# Patient Record
Sex: MALE | Race: BLACK OR AFRICAN AMERICAN | Employment: OTHER | URBAN - METROPOLITAN AREA
[De-identification: names, ages, dates, MRNs, and addresses within clinical notes are randomized per-mention and may not be internally consistent; named-entity substitution may affect disease eponyms.]

---

## 2020-07-11 ENCOUNTER — APPOINTMENT (OUTPATIENT)
Dept: ULTRASOUND IMAGING | Age: 70
End: 2020-07-11
Payer: MEDICARE

## 2020-07-11 ENCOUNTER — HOSPITAL ENCOUNTER (EMERGENCY)
Age: 70
Discharge: HOME | End: 2020-07-11
Payer: MEDICARE

## 2020-07-11 ENCOUNTER — APPOINTMENT (OUTPATIENT)
Dept: CT IMAGING | Age: 70
End: 2020-07-11
Payer: MEDICARE

## 2020-07-11 VITALS
DIASTOLIC BLOOD PRESSURE: 83 MMHG | HEIGHT: 74 IN | WEIGHT: 310 LBS | HEART RATE: 64 BPM | RESPIRATION RATE: 18 BRPM | OXYGEN SATURATION: 97 % | TEMPERATURE: 98.7 F | BODY MASS INDEX: 39.78 KG/M2 | SYSTOLIC BLOOD PRESSURE: 142 MMHG

## 2020-07-11 LAB
ALBUMIN SERPL-MCNC: 3.9 GM/DL (ref 3.4–5)
ALP BLD-CCNC: 77 IU/L (ref 40–129)
ALT SERPL-CCNC: 17 U/L (ref 10–40)
ANION GAP SERPL CALCULATED.3IONS-SCNC: 11 MMOL/L (ref 4–16)
AST SERPL-CCNC: 16 IU/L (ref 15–37)
BACTERIA: NEGATIVE /HPF
BASOPHILS ABSOLUTE: 0 K/CU MM
BASOPHILS RELATIVE PERCENT: 0.3 % (ref 0–1)
BILIRUB SERPL-MCNC: 1 MG/DL (ref 0–1)
BILIRUBIN URINE: NEGATIVE MG/DL
BLOOD, URINE: NEGATIVE
BUN BLDV-MCNC: 9 MG/DL (ref 6–23)
CALCIUM SERPL-MCNC: 9 MG/DL (ref 8.3–10.6)
CHLORIDE BLD-SCNC: 101 MMOL/L (ref 99–110)
CLARITY: CLEAR
CO2: 20 MMOL/L (ref 21–32)
COLOR: YELLOW
CREAT SERPL-MCNC: 1 MG/DL (ref 0.9–1.3)
DIFFERENTIAL TYPE: ABNORMAL
EOSINOPHILS ABSOLUTE: 0.1 K/CU MM
EOSINOPHILS RELATIVE PERCENT: 0.8 % (ref 0–3)
GFR AFRICAN AMERICAN: >60 ML/MIN/1.73M2
GFR NON-AFRICAN AMERICAN: >60 ML/MIN/1.73M2
GLUCOSE BLD-MCNC: 113 MG/DL (ref 70–99)
GLUCOSE, URINE: NEGATIVE MG/DL
HCT VFR BLD CALC: 39.5 % (ref 42–52)
HEMOGLOBIN: 13.2 GM/DL (ref 13.5–18)
IMMATURE NEUTROPHIL %: 0.4 % (ref 0–0.43)
KETONES, URINE: NEGATIVE MG/DL
LACTATE: 2.2 MMOL/L (ref 0.4–2)
LEUKOCYTE ESTERASE, URINE: ABNORMAL
LIPASE: 20 IU/L (ref 13–60)
LYMPHOCYTES ABSOLUTE: 1.6 K/CU MM
LYMPHOCYTES RELATIVE PERCENT: 10 % (ref 24–44)
MCH RBC QN AUTO: 34.2 PG (ref 27–31)
MCHC RBC AUTO-ENTMCNC: 33.4 % (ref 32–36)
MCV RBC AUTO: 102.3 FL (ref 78–100)
MONOCYTES ABSOLUTE: 1.3 K/CU MM
MONOCYTES RELATIVE PERCENT: 8.2 % (ref 0–4)
MUCUS: ABNORMAL HPF
NITRITE URINE, QUANTITATIVE: NEGATIVE
NUCLEATED RBC %: 0 %
PDW BLD-RTO: 13.2 % (ref 11.7–14.9)
PH, URINE: 5 (ref 5–8)
PLATELET # BLD: 217 K/CU MM (ref 140–440)
PMV BLD AUTO: 9.4 FL (ref 7.5–11.1)
POTASSIUM SERPL-SCNC: 3.9 MMOL/L (ref 3.5–5.1)
PROTEIN UA: NEGATIVE MG/DL
RBC # BLD: 3.86 M/CU MM (ref 4.6–6.2)
RBC URINE: 1 /HPF (ref 0–3)
SEGMENTED NEUTROPHILS ABSOLUTE COUNT: 12.7 K/CU MM
SEGMENTED NEUTROPHILS RELATIVE PERCENT: 80.3 % (ref 36–66)
SODIUM BLD-SCNC: 132 MMOL/L (ref 135–145)
SPECIFIC GRAVITY UA: 1.03 (ref 1–1.03)
SQUAMOUS EPITHELIAL: <1 /HPF
TOTAL IMMATURE NEUTOROPHIL: 0.06 K/CU MM
TOTAL NUCLEATED RBC: 0 K/CU MM
TOTAL PROTEIN: 7.6 GM/DL (ref 6.4–8.2)
TRICHOMONAS: ABNORMAL /HPF
UROBILINOGEN, URINE: NORMAL MG/DL (ref 0.2–1)
WBC # BLD: 15.8 K/CU MM (ref 4–10.5)
WBC UA: 3 /HPF (ref 0–2)

## 2020-07-11 PROCEDURE — 83605 ASSAY OF LACTIC ACID: CPT

## 2020-07-11 PROCEDURE — 76870 US EXAM SCROTUM: CPT

## 2020-07-11 PROCEDURE — 81001 URINALYSIS AUTO W/SCOPE: CPT

## 2020-07-11 PROCEDURE — 6360000004 HC RX CONTRAST MEDICATION: Performed by: PHYSICIAN ASSISTANT

## 2020-07-11 PROCEDURE — 96374 THER/PROPH/DIAG INJ IV PUSH: CPT

## 2020-07-11 PROCEDURE — 99284 EMERGENCY DEPT VISIT MOD MDM: CPT

## 2020-07-11 PROCEDURE — 6360000002 HC RX W HCPCS: Performed by: PHYSICIAN ASSISTANT

## 2020-07-11 PROCEDURE — 93975 VASCULAR STUDY: CPT

## 2020-07-11 PROCEDURE — 74177 CT ABD & PELVIS W/CONTRAST: CPT

## 2020-07-11 PROCEDURE — 85025 COMPLETE CBC W/AUTO DIFF WBC: CPT

## 2020-07-11 PROCEDURE — 80053 COMPREHEN METABOLIC PANEL: CPT

## 2020-07-11 PROCEDURE — 6370000000 HC RX 637 (ALT 250 FOR IP): Performed by: PHYSICIAN ASSISTANT

## 2020-07-11 PROCEDURE — 83690 ASSAY OF LIPASE: CPT

## 2020-07-11 RX ORDER — IBUPROFEN 400 MG/1
400 TABLET ORAL EVERY 6 HOURS PRN
Qty: 30 TABLET | Refills: 0 | Status: SHIPPED | OUTPATIENT
Start: 2020-07-11

## 2020-07-11 RX ORDER — LEVOFLOXACIN 500 MG/1
500 TABLET, FILM COATED ORAL DAILY
Qty: 10 TABLET | Refills: 0 | Status: SHIPPED | OUTPATIENT
Start: 2020-07-11 | End: 2020-07-21

## 2020-07-11 RX ORDER — LEVOFLOXACIN 500 MG/1
500 TABLET, FILM COATED ORAL DAILY
Status: DISCONTINUED | OUTPATIENT
Start: 2020-07-11 | End: 2020-07-11 | Stop reason: HOSPADM

## 2020-07-11 RX ORDER — FENTANYL CITRATE 50 UG/ML
50 INJECTION, SOLUTION INTRAMUSCULAR; INTRAVENOUS ONCE
Status: COMPLETED | OUTPATIENT
Start: 2020-07-11 | End: 2020-07-11

## 2020-07-11 RX ORDER — HYDROCODONE BITARTRATE AND ACETAMINOPHEN 5; 325 MG/1; MG/1
1 TABLET ORAL EVERY 6 HOURS PRN
Qty: 15 TABLET | Refills: 0 | Status: SHIPPED | OUTPATIENT
Start: 2020-07-11 | End: 2020-07-18

## 2020-07-11 RX ADMIN — IOPAMIDOL 75 ML: 755 INJECTION, SOLUTION INTRAVENOUS at 12:22

## 2020-07-11 RX ADMIN — FENTANYL CITRATE 50 MCG: 50 INJECTION INTRAMUSCULAR; INTRAVENOUS at 11:35

## 2020-07-11 RX ADMIN — LEVOFLOXACIN 500 MG: 500 TABLET, FILM COATED ORAL at 13:45

## 2020-07-11 ASSESSMENT — PAIN SCALES - GENERAL
PAINLEVEL_OUTOF10: 8
PAINLEVEL_OUTOF10: 9

## 2020-07-11 ASSESSMENT — PAIN DESCRIPTION - PAIN TYPE: TYPE: ACUTE PAIN

## 2020-07-15 NOTE — ED PROVIDER NOTES
EMERGENCY DEPARTMENT ENCOUNTER      PCP: Lenin Becker MD    [address] [facility], [place]    Chief Complaint   Patient presents with    Groin Swelling     right      This patient was not evaluated by the attending physician. I have independently evaluated this patient. HPI    Rhonda Bonner is a 77 y.o. male who presents to the emergency department today with right inguinal/right-sided testicular pain with right-sided scrotal swelling and edema. Status developed over the last several days but worsening today. He says been exquisitely tender to touch. He denies any dysuria, lymph node swelling. No active fevers. No recent trauma. Denies being sexually active. No history of urologic issues in the past.    REVIEW OF SYSTEMS    Constitutional:  Denies fever, chills, weight loss or weakness   HENT:  Denies sore throat or ear pain   Cardiovascular:  Denies chest pain, palpitations or swelling   Respiratory:  Denies cough or shortness of breath   GI:  See HPI above  : See HPI. No hematuria or dysuria. No vaginal symptoms. Musculoskeletal:  Denies back pain or groin pain or masses. No pain or swelling of extremities. Skin:  Denies rash  Neurologic:  Denies headache, focal weakness or sensory changes   Endocrine:  Denies polyuria or polydypsia   Lymphatic:  Denies swollen glands     All other review of systems are negative  See HPI and nursing notes for additional information     PAST MEDICAL & SURGICAL HISTORY    Past Medical History:   Diagnosis Date    Arthritis     Hypertension     MI, old      Past Surgical History:   Procedure Laterality Date    CARDIAC CATHETERIZATION      TESTICLE SURGERY         CURRENT MEDICATIONS    Current Outpatient Rx   Medication Sig Dispense Refill    HYDROcodone-acetaminophen (NORCO) 5-325 MG per tablet Take 1 tablet by mouth every 6 hours as needed for Pain for up to 7 days.  15 tablet 0    ibuprofen (IBU) 400 MG tablet Take 1 tablet by mouth every 6 hours as needed for rate, normal rhythm, no murmurs    GI:     No gross discoloration.       -no Clarendon's sign (periumbilical ecchymosis)       -no Grey-Maharaj's sign (flank ecchymosis)  (necrosis/hemmorrhage may cause subcutaneous blood leakage)    Bowel sounds present, no audible bruits. Soft,  No distention, no guarding, no rigidity,   no abdominal tenderness, no rebound, no palpable pulsatile masses,   No McBurney's point tenderness   Negative Rovsing sign    Negative Banks's sign. : On evidence of the scrotum, there is mild swelling into the right scrotum, no obvious scrotal erythema, signs of cellulitis. Palpable tenderness in the right superior lateral aspect of the testicle, no obvious abscess. Tenderness into the spermatic cord area, negative hernia check. No Bell clapper deformity, signs of significant retraction or torsion on initial examination. Back:   No CVA tenderness to percussion. Musculoskeletal:  No edema, no deformity  Vascular: There is no discernible palpable discrepancy of radial pulses bilaterally or between radial pulses & femoral pulses.   Integument: No rash, dry skin  Neurologic:  Alert & oriented, normal speech  Psychiatric: Cooperative, pleasant affect       LABS:  Results for orders placed or performed during the hospital encounter of 07/11/20   Urinalysis (Lab)   Result Value Ref Range    Color, UA YELLOW YELLOW    Clarity, UA CLEAR CLEAR    Glucose, Urine NEGATIVE NEGATIVE MG/DL    Bilirubin Urine NEGATIVE NEGATIVE MG/DL    Ketones, Urine NEGATIVE NEGATIVE MG/DL    Specific Gravity, UA 1.035 1.001 - 1.035    Blood, Urine NEGATIVE NEGATIVE    pH, Urine 5.0 5.0 - 8.0    Protein, UA NEGATIVE NEGATIVE MG/DL    Urobilinogen, Urine NORMAL 0.2 - 1.0 MG/DL    Nitrite Urine, Quantitative NEGATIVE NEGATIVE    Leukocyte Esterase, Urine SMALL (A) NEGATIVE    RBC, UA 1 0 - 3 /HPF    WBC, UA 3 (H) 0 - 2 /HPF    Bacteria, UA NEGATIVE NEGATIVE /HPF    Squam Epithel, UA <1 /HPF    Mucus, UA RARE (A) NEGATIVE radiation dose to as low as reasonably achievable. COMPARISON: Testicular ultrasound from earlier today HISTORY: ORDERING SYSTEM PROVIDED HISTORY: Lower abdominal pain, testicular pain. TECHNOLOGIST PROVIDED HISTORY: Reason for exam:->Lower abdominal pain, testicular pain. Additional Contrast?->None Reason for Exam: Lower abdominal pain, testicular pain. Acuity: Acute Type of Exam: Initial Additional signs and symptoms: pain and swelling started 2am yesterday FINDINGS: Lower Chest: There is minimal dependent atelectasis at the bilateral lung bases. No pleural effusion. The heart is of normal size. There is mild-to-moderate atherosclerotic calcification of the coronary arteries. No pericardial effusion. Organs: The liver, gallbladder, pancreas, spleen and the bilateral adrenal glands are otherwise within normal limits. There is a 2 mm nonobstructive calculus at the lower pole right kidney. There is a 1 cm cyst at the lower pole left kidney. There is no left or right hydronephrosis. GI/Bowel: There is no bowel obstruction or pneumoperitoneum. There is no acute appendicitis. There is no acute diverticulitis. Pelvis: The urinary bladder is under-distended. There is small right-sided hydrocele in the scrotum. There is asymmetric enhancement of the right epididymis and vessels in the right scrotum, may be related to right-sided epididymitis. The remainder of the pelvic structures are grossly within normal limits. There is no free pelvic fluid. Peritoneum/Retroperitoneum: There is no ascites or pneumoperitoneum. The abdominal aorta is of normal size. There is no mesenteric or retroperitoneal lymphadenopathy. Bones/Soft Tissues: There are moderate to severe degenerative changes at L4-5 disc space. There is no acute osseous abnormality. There is no acute soft tissue abnormality. Asymmetric enhancement of the right epididymis and vessels in the right scrotum, may be related to right-sided epididymitis.  Small right-sided hydrocele, likely reactive. 2 mm nonobstructive calculus at the lower pole right kidney. No left or right hydronephrosis. Us Dup Abd Pel Retro Scrot Complete    Result Date: 7/11/2020  EXAMINATION: ULTRASOUND OF THE SCROTUM/TESTICLES WITH COLOR DOPPLER FLOW EVALUATION; DOPPLER EVALUATION OF THE PELVIS 7/11/2020 COMPARISON: None. HISTORY: ORDERING SYSTEM PROVIDED HISTORY: testicular pain TECHNOLOGIST PROVIDED HISTORY: Reason for exam:->testicular pain Reason for Exam: rt sided pain x 2 days Acuity: Acute 29-year-old male with acute right-sided testicular pain for 2 days FINDINGS: Measurements: Right testicle: 3.9 x 3.2 x 2.5 cm Right epididymis: 1.5 x 2.0 x 2.3 cm. Left testicle: 4.2 x 2.6 x 1.4 cm. Left epididymis: 2.0 x 2.0 x 1.2 cm Right: Grey scale: Right testicular cyst measuring 2 mm in diameter. The right testicle otherwise demonstrates normal homogeneous echotexture without focal lesion. No evidence of testicular microlithiasis. Doppler Evaluation:  There is normal arterial and venous Doppler flow within the testicle. Scrotal Sac:  Small right-sided hydrocele. Epididymis:  Multiple epididymal head cysts measuring up to 8 x 7 x 6 mm. Hypervascularity of the right epididymal body and tail. Heterogeneity of the right epididymal tail with increased size. Left: Grey scale: The left testicle demonstrates normal homogeneous echotexture without focal lesion. There is microlithiasis within the left testicle. Doppler Evaluation:  There is normal arterial and venous Doppler flow within the testicle. Scrotal Sac:  No evidence of hydrocele. Epididymis:  Left epididymal head cyst measures 1.9 x 1.6 x 0.7 cm.     1. Findings suggesting right-sided epididymitis. 2. Small right-sided hydrocele. Bilateral epididymal head cysts as detailed above. 3. Left-sided testicular microlithiasis. 4. Normal arterial and venous Doppler flow within both testicles.  5. 2 mm right testicular cyst.     ED COURSE & MEDICAL DECISION MAKING      Patient presents as above. Emergent etiologies considered. Secondary to patient's presentation and physical exam findings a work-up was initiated. In brief patient having worsening right-sided testicular pain, scrotal pain over the last several days intensifying today. On exam is tender but no obvious signs of torsion. Immediately called for ultrasound, work-up was initiated. Findings most consistent with epididymitis    That was both evident on the CT scan as well as ultrasound. He is not sexually active, he did have a mild white count, did not appear to be septic. Will be initiated on Levaquin, will encourage follow-up from urology in the near future. Given strict return precautions. Clinical  IMPRESSION    1. Epididymitis, right    2. Hydrocele, unspecified hydrocele type    3. Testicular cyst      Comment: Please note this report has been produced using speech recognition software and may contain errors related to that system including errors in grammar, punctuation, and spelling, as well as words and phrases that may be inappropriate. If there are any questions or concerns please feel free to contact the dictating provider for clarification.         Gwendolyn García 411, PA  07/14/20 2529

## 2020-08-25 ENCOUNTER — HOSPITAL ENCOUNTER (OUTPATIENT)
Age: 70
Discharge: HOME | End: 2020-08-25
Payer: MEDICARE

## 2020-08-25 LAB
BASOPHILS ABSOLUTE: 0.1 K/CU MM
BASOPHILS RELATIVE PERCENT: 0.7 % (ref 0–1)
DIFFERENTIAL TYPE: ABNORMAL
EOSINOPHILS ABSOLUTE: 0.4 K/CU MM
EOSINOPHILS RELATIVE PERCENT: 5.1 % (ref 0–3)
HCT VFR BLD CALC: 41.5 % (ref 42–52)
HEMOGLOBIN: 13.1 GM/DL (ref 13.5–18)
IMMATURE NEUTROPHIL %: 0.4 % (ref 0–0.43)
LYMPHOCYTES ABSOLUTE: 2.2 K/CU MM
LYMPHOCYTES RELATIVE PERCENT: 30 % (ref 24–44)
MCH RBC QN AUTO: 33.9 PG (ref 27–31)
MCHC RBC AUTO-ENTMCNC: 31.6 % (ref 32–36)
MCV RBC AUTO: 107.5 FL (ref 78–100)
MONOCYTES ABSOLUTE: 0.6 K/CU MM
MONOCYTES RELATIVE PERCENT: 8.3 % (ref 0–4)
NUCLEATED RBC %: 0 %
PDW BLD-RTO: 13.6 % (ref 11.7–14.9)
PLATELET # BLD: 225 K/CU MM (ref 140–440)
PMV BLD AUTO: 10.2 FL (ref 7.5–11.1)
RBC # BLD: 3.86 M/CU MM (ref 4.6–6.2)
SEGMENTED NEUTROPHILS ABSOLUTE COUNT: 4 K/CU MM
SEGMENTED NEUTROPHILS RELATIVE PERCENT: 55.5 % (ref 36–66)
TOTAL IMMATURE NEUTOROPHIL: 0.03 K/CU MM
TOTAL NUCLEATED RBC: 0 K/CU MM
WBC # BLD: 7.2 K/CU MM (ref 4–10.5)

## 2020-08-25 PROCEDURE — 85025 COMPLETE CBC W/AUTO DIFF WBC: CPT

## 2020-08-25 PROCEDURE — 36415 COLL VENOUS BLD VENIPUNCTURE: CPT

## 2020-09-22 ENCOUNTER — HOSPITAL ENCOUNTER (OUTPATIENT)
Age: 70
Discharge: HOME | End: 2020-09-22
Payer: MEDICARE

## 2020-09-22 ENCOUNTER — HOSPITAL ENCOUNTER (OUTPATIENT)
Dept: GENERAL RADIOLOGY | Age: 70
Discharge: HOME | End: 2020-09-22
Payer: MEDICARE

## 2020-09-22 PROCEDURE — 73560 X-RAY EXAM OF KNEE 1 OR 2: CPT

## 2022-11-04 ENCOUNTER — HOSPITAL ENCOUNTER (INPATIENT)
Age: 72
LOS: 5 days | Discharge: HOME HEALTH | DRG: 872 | End: 2022-11-09
Attending: EMERGENCY MEDICINE | Admitting: HOSPITALIST
Payer: MEDICARE

## 2022-11-04 ENCOUNTER — APPOINTMENT (OUTPATIENT)
Dept: GENERAL RADIOLOGY | Age: 72
DRG: 872 | End: 2022-11-04
Payer: MEDICARE

## 2022-11-04 DIAGNOSIS — R53.1 GENERALIZED WEAKNESS: Primary | ICD-10-CM

## 2022-11-04 DIAGNOSIS — E87.1 HYPONATREMIA: ICD-10-CM

## 2022-11-04 DIAGNOSIS — E86.0 DEHYDRATION: ICD-10-CM

## 2022-11-04 DIAGNOSIS — D64.9 ANEMIA, UNSPECIFIED TYPE: ICD-10-CM

## 2022-11-04 PROBLEM — R53.81 DEBILITY: Status: ACTIVE | Noted: 2022-11-04

## 2022-11-04 LAB
ALBUMIN SERPL-MCNC: 3.2 GM/DL (ref 3.4–5)
ALP BLD-CCNC: 110 IU/L (ref 40–129)
ALT SERPL-CCNC: 31 U/L (ref 10–40)
ANION GAP SERPL CALCULATED.3IONS-SCNC: 12 MMOL/L (ref 4–16)
AST SERPL-CCNC: 26 IU/L (ref 15–37)
BILIRUB SERPL-MCNC: 1 MG/DL (ref 0–1)
BUN BLDV-MCNC: 9 MG/DL (ref 6–23)
CALCIUM SERPL-MCNC: 9 MG/DL (ref 8.3–10.6)
CHLORIDE BLD-SCNC: 95 MMOL/L (ref 99–110)
CO2: 21 MMOL/L (ref 21–32)
CREAT SERPL-MCNC: 1.2 MG/DL (ref 0.9–1.3)
GFR SERPL CREATININE-BSD FRML MDRD: >60 ML/MIN/1.73M2
GLUCOSE BLD-MCNC: 119 MG/DL (ref 70–99)
HCT VFR BLD CALC: 27.8 % (ref 42–52)
HEMOGLOBIN: 8.9 GM/DL (ref 13.5–18)
LIPASE: 19 IU/L (ref 13–60)
MAGNESIUM: 1.7 MG/DL (ref 1.8–2.4)
MCH RBC QN AUTO: 31.4 PG (ref 27–31)
MCHC RBC AUTO-ENTMCNC: 32 % (ref 32–36)
MCV RBC AUTO: 98.2 FL (ref 78–100)
PDW BLD-RTO: 14.6 % (ref 11.7–14.9)
PLATELET # BLD: 457 K/CU MM (ref 140–440)
PMV BLD AUTO: 9.3 FL (ref 7.5–11.1)
POTASSIUM SERPL-SCNC: 4.2 MMOL/L (ref 3.5–5.1)
PRO-BNP: 863.5 PG/ML
RAPID INFLUENZA  B AGN: NEGATIVE
RAPID INFLUENZA A AGN: NEGATIVE
RBC # BLD: 2.83 M/CU MM (ref 4.6–6.2)
SARS-COV-2, NAAT: NOT DETECTED
SODIUM BLD-SCNC: 128 MMOL/L (ref 135–145)
SOURCE: NORMAL
TOTAL PROTEIN: 7.3 GM/DL (ref 6.4–8.2)
TROPONIN T: <0.01 NG/ML
WBC # BLD: 14.6 K/CU MM (ref 4–10.5)

## 2022-11-04 PROCEDURE — 87804 INFLUENZA ASSAY W/OPTIC: CPT

## 2022-11-04 PROCEDURE — 83880 ASSAY OF NATRIURETIC PEPTIDE: CPT

## 2022-11-04 PROCEDURE — 87635 SARS-COV-2 COVID-19 AMP PRB: CPT

## 2022-11-04 PROCEDURE — 96360 HYDRATION IV INFUSION INIT: CPT

## 2022-11-04 PROCEDURE — 84484 ASSAY OF TROPONIN QUANT: CPT

## 2022-11-04 PROCEDURE — 80053 COMPREHEN METABOLIC PANEL: CPT

## 2022-11-04 PROCEDURE — 83735 ASSAY OF MAGNESIUM: CPT

## 2022-11-04 PROCEDURE — 71045 X-RAY EXAM CHEST 1 VIEW: CPT

## 2022-11-04 PROCEDURE — 51798 US URINE CAPACITY MEASURE: CPT

## 2022-11-04 PROCEDURE — 1200000000 HC SEMI PRIVATE

## 2022-11-04 PROCEDURE — 85027 COMPLETE CBC AUTOMATED: CPT

## 2022-11-04 PROCEDURE — 99285 EMERGENCY DEPT VISIT HI MDM: CPT

## 2022-11-04 PROCEDURE — 87507 IADNA-DNA/RNA PROBE TQ 12-25: CPT

## 2022-11-04 PROCEDURE — 96361 HYDRATE IV INFUSION ADD-ON: CPT

## 2022-11-04 PROCEDURE — 2580000003 HC RX 258: Performed by: EMERGENCY MEDICINE

## 2022-11-04 PROCEDURE — 83690 ASSAY OF LIPASE: CPT

## 2022-11-04 RX ORDER — 0.9 % SODIUM CHLORIDE 0.9 %
1000 INTRAVENOUS SOLUTION INTRAVENOUS ONCE
Status: COMPLETED | OUTPATIENT
Start: 2022-11-04 | End: 2022-11-04

## 2022-11-04 RX ADMIN — SODIUM CHLORIDE 1000 ML: 9 INJECTION, SOLUTION INTRAVENOUS at 19:53

## 2022-11-04 ASSESSMENT — PAIN SCALES - GENERAL: PAINLEVEL_OUTOF10: 0

## 2022-11-04 ASSESSMENT — PAIN - FUNCTIONAL ASSESSMENT: PAIN_FUNCTIONAL_ASSESSMENT: 0-10

## 2022-11-04 NOTE — ED NOTES
Patient arrived via EMS from home with c/o fatigue and dehydration. When asking patient why he thinks he is dehydrated he stated his mouth is really dry. He states he was having diarrhea but he called his doctors and was able to get imodium in which it has stopped. Patient states its been going on for 3 weeks. Alert and oriented x4,respirations equal and unlabored, skin warm and dry. Gave patient a warm blanket, urinal, Placed IV and labs collected.      Cleveland Joyner RN  11/04/22 [id number]

## 2022-11-05 ENCOUNTER — APPOINTMENT (OUTPATIENT)
Dept: CT IMAGING | Age: 72
DRG: 872 | End: 2022-11-05
Payer: MEDICARE

## 2022-11-05 LAB
ALBUMIN SERPL-MCNC: 2.7 GM/DL (ref 3.4–5)
ALP BLD-CCNC: 116 IU/L (ref 40–128)
ALT SERPL-CCNC: 26 U/L (ref 10–40)
ANION GAP SERPL CALCULATED.3IONS-SCNC: 14 MMOL/L (ref 4–16)
AST SERPL-CCNC: 27 IU/L (ref 15–37)
BACTERIA: ABNORMAL /HPF
BASOPHILS ABSOLUTE: 0 K/CU MM
BASOPHILS RELATIVE PERCENT: 0.3 % (ref 0–1)
BILIRUB SERPL-MCNC: 1.3 MG/DL (ref 0–1)
BILIRUBIN URINE: ABNORMAL MG/DL
BLOOD, URINE: ABNORMAL
BUN BLDV-MCNC: 8 MG/DL (ref 6–23)
CALCIUM SERPL-MCNC: 8.5 MG/DL (ref 8.3–10.6)
CHLORIDE BLD-SCNC: 98 MMOL/L (ref 99–110)
CLARITY: ABNORMAL
CO2: 20 MMOL/L (ref 21–32)
COLOR: YELLOW
CREAT SERPL-MCNC: 1.2 MG/DL (ref 0.9–1.3)
DIFFERENTIAL TYPE: ABNORMAL
EOSINOPHILS ABSOLUTE: 0 K/CU MM
EOSINOPHILS RELATIVE PERCENT: 0.1 % (ref 0–3)
FERRITIN: 608 NG/ML (ref 30–400)
GFR SERPL CREATININE-BSD FRML MDRD: >60 ML/MIN/1.73M2
GLUCOSE BLD-MCNC: 101 MG/DL (ref 70–99)
GLUCOSE, URINE: NEGATIVE MG/DL
HCT VFR BLD CALC: 26.6 % (ref 42–52)
HEMOGLOBIN: 8.5 GM/DL (ref 13.5–18)
HYALINE CASTS: 5 /LPF
IMMATURE NEUTROPHIL %: 0.6 % (ref 0–0.43)
IRON: 8 UG/DL (ref 59–158)
KETONES, URINE: NEGATIVE MG/DL
LACTATE: 1 MMOL/L (ref 0.4–2)
LEUKOCYTE ESTERASE, URINE: ABNORMAL
LYMPHOCYTES ABSOLUTE: 0.9 K/CU MM
LYMPHOCYTES RELATIVE PERCENT: 6 % (ref 24–44)
MCH RBC QN AUTO: 31.5 PG (ref 27–31)
MCHC RBC AUTO-ENTMCNC: 32 % (ref 32–36)
MCV RBC AUTO: 98.5 FL (ref 78–100)
MONOCYTES ABSOLUTE: 0.6 K/CU MM
MONOCYTES RELATIVE PERCENT: 3.5 % (ref 0–4)
MUCUS: ABNORMAL HPF
NITRITE URINE, QUANTITATIVE: NEGATIVE
NUCLEATED RBC %: 0 %
PCT TRANSFERRIN: 5 % (ref 10–44)
PDW BLD-RTO: 15 % (ref 11.7–14.9)
PH, URINE: 5.5 (ref 5–8)
PLATELET # BLD: 408 K/CU MM (ref 140–440)
PMV BLD AUTO: 9 FL (ref 7.5–11.1)
POTASSIUM SERPL-SCNC: 4.1 MMOL/L (ref 3.5–5.1)
PROTEIN UA: ABNORMAL MG/DL
RBC # BLD: 2.7 M/CU MM (ref 4.6–6.2)
RBC URINE: 139 /HPF (ref 0–3)
SEGMENTED NEUTROPHILS ABSOLUTE COUNT: 13.9 K/CU MM
SEGMENTED NEUTROPHILS RELATIVE PERCENT: 89.5 % (ref 36–66)
SODIUM BLD-SCNC: 132 MMOL/L (ref 135–145)
SPECIFIC GRAVITY UA: 1.01 (ref 1–1.03)
TOTAL IMMATURE NEUTOROPHIL: 0.09 K/CU MM
TOTAL IRON BINDING CAPACITY: 159 UG/DL (ref 250–450)
TOTAL NUCLEATED RBC: 0 K/CU MM
TOTAL PROTEIN: 6 GM/DL (ref 6.4–8.2)
TRICHOMONAS: ABNORMAL /HPF
TSH HIGH SENSITIVITY: 0.36 UIU/ML (ref 0.27–4.2)
UNSATURATED IRON BINDING CAPACITY: 151 UG/DL (ref 110–370)
UROBILINOGEN, URINE: 4 MG/DL (ref 0.2–1)
WBC # BLD: 15.6 K/CU MM (ref 4–10.5)
WBC CLUMP: ABNORMAL /HPF
WBC UA: 89 /HPF (ref 0–2)

## 2022-11-05 PROCEDURE — 84443 ASSAY THYROID STIM HORMONE: CPT

## 2022-11-05 PROCEDURE — 1200000000 HC SEMI PRIVATE

## 2022-11-05 PROCEDURE — 87086 URINE CULTURE/COLONY COUNT: CPT

## 2022-11-05 PROCEDURE — 83605 ASSAY OF LACTIC ACID: CPT

## 2022-11-05 PROCEDURE — 74176 CT ABD & PELVIS W/O CONTRAST: CPT

## 2022-11-05 PROCEDURE — 87040 BLOOD CULTURE FOR BACTERIA: CPT

## 2022-11-05 PROCEDURE — 82607 VITAMIN B-12: CPT

## 2022-11-05 PROCEDURE — 81001 URINALYSIS AUTO W/SCOPE: CPT

## 2022-11-05 PROCEDURE — 93005 ELECTROCARDIOGRAM TRACING: CPT | Performed by: INTERNAL MEDICINE

## 2022-11-05 PROCEDURE — 87449 NOS EACH ORGANISM AG IA: CPT

## 2022-11-05 PROCEDURE — 87329 GIARDIA AG IA: CPT

## 2022-11-05 PROCEDURE — 83540 ASSAY OF IRON: CPT

## 2022-11-05 PROCEDURE — 87088 URINE BACTERIA CULTURE: CPT

## 2022-11-05 PROCEDURE — 6360000004 HC RX CONTRAST MEDICATION: Performed by: SPECIALIST

## 2022-11-05 PROCEDURE — 6370000000 HC RX 637 (ALT 250 FOR IP): Performed by: HOSPITALIST

## 2022-11-05 PROCEDURE — 2580000003 HC RX 258: Performed by: HOSPITALIST

## 2022-11-05 PROCEDURE — 87150 DNA/RNA AMPLIFIED PROBE: CPT

## 2022-11-05 PROCEDURE — 6360000002 HC RX W HCPCS: Performed by: STUDENT IN AN ORGANIZED HEALTH CARE EDUCATION/TRAINING PROGRAM

## 2022-11-05 PROCEDURE — A4641 RADIOPHARM DX AGENT NOC: HCPCS | Performed by: SPECIALIST

## 2022-11-05 PROCEDURE — 82746 ASSAY OF FOLIC ACID SERUM: CPT

## 2022-11-05 PROCEDURE — 82728 ASSAY OF FERRITIN: CPT

## 2022-11-05 PROCEDURE — 85025 COMPLETE CBC W/AUTO DIFF WBC: CPT

## 2022-11-05 PROCEDURE — 87186 SC STD MICRODIL/AGAR DIL: CPT

## 2022-11-05 PROCEDURE — 87324 CLOSTRIDIUM AG IA: CPT

## 2022-11-05 PROCEDURE — 36415 COLL VENOUS BLD VENIPUNCTURE: CPT

## 2022-11-05 PROCEDURE — 82270 OCCULT BLOOD FECES: CPT

## 2022-11-05 PROCEDURE — 80053 COMPREHEN METABOLIC PANEL: CPT

## 2022-11-05 PROCEDURE — 6370000000 HC RX 637 (ALT 250 FOR IP): Performed by: STUDENT IN AN ORGANIZED HEALTH CARE EDUCATION/TRAINING PROGRAM

## 2022-11-05 PROCEDURE — 94761 N-INVAS EAR/PLS OXIMETRY MLT: CPT

## 2022-11-05 PROCEDURE — 6360000002 HC RX W HCPCS: Performed by: HOSPITALIST

## 2022-11-05 PROCEDURE — 87507 IADNA-DNA/RNA PROBE TQ 12-25: CPT

## 2022-11-05 PROCEDURE — 81003 URINALYSIS AUTO W/O SCOPE: CPT

## 2022-11-05 PROCEDURE — 2580000003 HC RX 258: Performed by: STUDENT IN AN ORGANIZED HEALTH CARE EDUCATION/TRAINING PROGRAM

## 2022-11-05 PROCEDURE — 83550 IRON BINDING TEST: CPT

## 2022-11-05 PROCEDURE — 99223 1ST HOSP IP/OBS HIGH 75: CPT | Performed by: INTERNAL MEDICINE

## 2022-11-05 PROCEDURE — 83630 LACTOFERRIN FECAL (QUAL): CPT

## 2022-11-05 RX ORDER — ENOXAPARIN SODIUM 100 MG/ML
30 INJECTION SUBCUTANEOUS 2 TIMES DAILY
Status: DISCONTINUED | OUTPATIENT
Start: 2022-11-05 | End: 2022-11-09 | Stop reason: HOSPADM

## 2022-11-05 RX ORDER — FAMOTIDINE 20 MG/1
20 TABLET, FILM COATED ORAL 2 TIMES DAILY
Status: DISCONTINUED | OUTPATIENT
Start: 2022-11-05 | End: 2022-11-09 | Stop reason: HOSPADM

## 2022-11-05 RX ORDER — ACETAMINOPHEN 650 MG/1
650 SUPPOSITORY RECTAL EVERY 6 HOURS PRN
Status: DISCONTINUED | OUTPATIENT
Start: 2022-11-05 | End: 2022-11-07

## 2022-11-05 RX ORDER — ASPIRIN 81 MG/1
81 TABLET, CHEWABLE ORAL DAILY
Status: DISCONTINUED | OUTPATIENT
Start: 2022-11-05 | End: 2022-11-09 | Stop reason: HOSPADM

## 2022-11-05 RX ORDER — SODIUM CHLORIDE 0.9 % (FLUSH) 0.9 %
5-40 SYRINGE (ML) INJECTION EVERY 12 HOURS SCHEDULED
Status: DISCONTINUED | OUTPATIENT
Start: 2022-11-05 | End: 2022-11-09 | Stop reason: HOSPADM

## 2022-11-05 RX ORDER — SODIUM CHLORIDE 9 MG/ML
INJECTION, SOLUTION INTRAVENOUS CONTINUOUS
Status: DISPENSED | OUTPATIENT
Start: 2022-11-05 | End: 2022-11-06

## 2022-11-05 RX ORDER — POLYETHYLENE GLYCOL 3350 17 G/17G
17 POWDER, FOR SOLUTION ORAL DAILY PRN
Status: DISCONTINUED | OUTPATIENT
Start: 2022-11-05 | End: 2022-11-09 | Stop reason: HOSPADM

## 2022-11-05 RX ORDER — MAGNESIUM SULFATE IN WATER 40 MG/ML
2000 INJECTION, SOLUTION INTRAVENOUS PRN
Status: DISCONTINUED | OUTPATIENT
Start: 2022-11-05 | End: 2022-11-09 | Stop reason: HOSPADM

## 2022-11-05 RX ORDER — SODIUM CHLORIDE 9 MG/ML
INJECTION, SOLUTION INTRAVENOUS CONTINUOUS
Status: DISCONTINUED | OUTPATIENT
Start: 2022-11-05 | End: 2022-11-05

## 2022-11-05 RX ORDER — ONDANSETRON 2 MG/ML
4 INJECTION INTRAMUSCULAR; INTRAVENOUS EVERY 6 HOURS PRN
Status: DISCONTINUED | OUTPATIENT
Start: 2022-11-05 | End: 2022-11-09 | Stop reason: HOSPADM

## 2022-11-05 RX ORDER — POTASSIUM CHLORIDE 7.45 MG/ML
10 INJECTION INTRAVENOUS PRN
Status: DISCONTINUED | OUTPATIENT
Start: 2022-11-05 | End: 2022-11-09 | Stop reason: HOSPADM

## 2022-11-05 RX ORDER — ATORVASTATIN CALCIUM 40 MG/1
40 TABLET, FILM COATED ORAL NIGHTLY
Status: DISCONTINUED | OUTPATIENT
Start: 2022-11-05 | End: 2022-11-09 | Stop reason: HOSPADM

## 2022-11-05 RX ORDER — LISINOPRIL 5 MG/1
10 TABLET ORAL DAILY
Status: DISCONTINUED | OUTPATIENT
Start: 2022-11-05 | End: 2022-11-06

## 2022-11-05 RX ORDER — SODIUM CHLORIDE 9 MG/ML
INJECTION, SOLUTION INTRAVENOUS PRN
Status: DISCONTINUED | OUTPATIENT
Start: 2022-11-05 | End: 2022-11-09 | Stop reason: HOSPADM

## 2022-11-05 RX ORDER — AMLODIPINE BESYLATE 5 MG/1
5 TABLET ORAL DAILY
Status: DISCONTINUED | OUTPATIENT
Start: 2022-11-05 | End: 2022-11-09 | Stop reason: HOSPADM

## 2022-11-05 RX ORDER — CLOPIDOGREL BISULFATE 75 MG/1
75 TABLET ORAL DAILY
Status: DISCONTINUED | OUTPATIENT
Start: 2022-11-05 | End: 2022-11-09 | Stop reason: HOSPADM

## 2022-11-05 RX ORDER — POTASSIUM CHLORIDE 20 MEQ/1
40 TABLET, EXTENDED RELEASE ORAL PRN
Status: DISCONTINUED | OUTPATIENT
Start: 2022-11-05 | End: 2022-11-09 | Stop reason: HOSPADM

## 2022-11-05 RX ORDER — ACETAMINOPHEN 325 MG/1
650 TABLET ORAL EVERY 6 HOURS PRN
Status: DISCONTINUED | OUTPATIENT
Start: 2022-11-05 | End: 2022-11-07

## 2022-11-05 RX ORDER — SODIUM CHLORIDE, SODIUM LACTATE, POTASSIUM CHLORIDE, CALCIUM CHLORIDE 600; 310; 30; 20 MG/100ML; MG/100ML; MG/100ML; MG/100ML
INJECTION, SOLUTION INTRAVENOUS CONTINUOUS
Status: DISCONTINUED | OUTPATIENT
Start: 2022-11-05 | End: 2022-11-05

## 2022-11-05 RX ORDER — SODIUM CHLORIDE 0.9 % (FLUSH) 0.9 %
5-40 SYRINGE (ML) INJECTION PRN
Status: DISCONTINUED | OUTPATIENT
Start: 2022-11-05 | End: 2022-11-09 | Stop reason: HOSPADM

## 2022-11-05 RX ORDER — ONDANSETRON 4 MG/1
4 TABLET, ORALLY DISINTEGRATING ORAL EVERY 8 HOURS PRN
Status: DISCONTINUED | OUTPATIENT
Start: 2022-11-05 | End: 2022-11-09 | Stop reason: HOSPADM

## 2022-11-05 RX ADMIN — ENOXAPARIN SODIUM 30 MG: 100 INJECTION SUBCUTANEOUS at 20:06

## 2022-11-05 RX ADMIN — CLOPIDOGREL BISULFATE 75 MG: 75 TABLET ORAL at 15:33

## 2022-11-05 RX ADMIN — ATORVASTATIN CALCIUM 40 MG: 40 TABLET, FILM COATED ORAL at 20:06

## 2022-11-05 RX ADMIN — FAMOTIDINE 20 MG: 20 TABLET ORAL at 09:04

## 2022-11-05 RX ADMIN — SODIUM CHLORIDE: 9 INJECTION, SOLUTION INTRAVENOUS at 08:58

## 2022-11-05 RX ADMIN — ENOXAPARIN SODIUM 30 MG: 100 INJECTION SUBCUTANEOUS at 09:04

## 2022-11-05 RX ADMIN — FAMOTIDINE 20 MG: 20 TABLET ORAL at 01:21

## 2022-11-05 RX ADMIN — METOPROLOL TARTRATE 12.5 MG: 25 TABLET, FILM COATED ORAL at 20:06

## 2022-11-05 RX ADMIN — FAMOTIDINE 20 MG: 20 TABLET ORAL at 20:06

## 2022-11-05 RX ADMIN — SODIUM CHLORIDE, POTASSIUM CHLORIDE, SODIUM LACTATE AND CALCIUM CHLORIDE: 600; 310; 30; 20 INJECTION, SOLUTION INTRAVENOUS at 01:11

## 2022-11-05 RX ADMIN — AMLODIPINE BESYLATE 5 MG: 5 TABLET ORAL at 15:33

## 2022-11-05 RX ADMIN — LISINOPRIL 10 MG: 5 TABLET ORAL at 15:32

## 2022-11-05 RX ADMIN — BARIUM SULFATE 900 ML: 21 SUSPENSION ORAL at 10:10

## 2022-11-05 RX ADMIN — ENOXAPARIN SODIUM 30 MG: 100 INJECTION SUBCUTANEOUS at 01:21

## 2022-11-05 RX ADMIN — SODIUM CHLORIDE, PRESERVATIVE FREE 10 ML: 5 INJECTION INTRAVENOUS at 20:06

## 2022-11-05 RX ADMIN — CEFTRIAXONE SODIUM 1000 MG: 1 INJECTION, POWDER, FOR SOLUTION INTRAMUSCULAR; INTRAVENOUS at 09:00

## 2022-11-05 RX ADMIN — ACETAMINOPHEN 650 MG: 325 TABLET ORAL at 09:04

## 2022-11-05 RX ADMIN — SODIUM CHLORIDE, PRESERVATIVE FREE 10 ML: 5 INJECTION INTRAVENOUS at 14:03

## 2022-11-05 RX ADMIN — ASPIRIN 81 MG CHEWABLE TABLET 81 MG: 81 TABLET CHEWABLE at 15:33

## 2022-11-05 ASSESSMENT — PAIN SCALES - GENERAL: PAINLEVEL_OUTOF10: 0

## 2022-11-05 NOTE — CONSULTS
[address] [place] [address], [address] [place] [address]                                  CONSULTATION    PATIENT NAME: Mohinder Rodriguez                       :        1950  MED REC NO:   [id number]                          ROOM:       [id number]  ACCOUNT NO:   [de-identified]                           ADMIT DATE: 2022  PROVIDER:     Judah Coello MD    CONSULT DATE:  2022    PRIMARY PHYSICIAN:  Dr. Jose Flynn. CHIEF COMPLAINT:  1. History of acute diarrheal illness, rule out infectious colitis. 2.  History of anemia, rule out GI bleeding. HISTORY OF PRESENT ILLNESS:  As follows: The patient is a 78-year-old  -American gentleman with history of recreational drug use,  tobacco use, EtOH use, hypertension, coronary artery disease,  osteoarthritis, who presented to the emergency room with 3-week history  of diarrhea along with dehydration, generalized weakness and debility. In the the emergency room, the patient had a blood workup done which  comprised of Chem profile which was remarkable for a sodium of 128 and  chloride was 96. LFTs were within normal limits. CBC showed a WBC  count of 14.6, hemoglobin 8.9 and platelet count was 610,503. The  patient's hemoglobin on 2020 was 13.1 gm%. The patient denies abdominal pain, hematemesis, melena or hematochezia. According to the patient, his diarrhea has resolved since his family  doctor gave him a prescription for Imodium. The patient has never had  an EGD done but according to the patient, he has had a couple of  colonoscopies done in [place]. The last one was around . Chest  x-ray was unremarkable. The patient is hemodynamically stable. He was  admitted for further workup. Stool studies are pending. REVIEW OF SYSTEMS:  CENTRAL NERVOUS SYSTEM:  The patient denies headache or focal  sensorimotor symptoms.   CARDIOVASCULAR SYSTEM:  No history of chest pain, but the patient  complains of shortness of breath upon exertion but no leg swelling. Rama Rg GENITOURINARY SYSTEM:  No history of dysuria, pyuria, or hematuria. MUSCULOSKELETAL SYSTEM:  The patient complains of generalized weakness. RESPIRATORY SYSTEM:  No history of cough, hemoptysis, fever or chills. PAST MEDICAL HISTORY:  Significant for history of hypertension, coronary  artery disease status post MI in the past, osteoarthritis and tobacco  abuse, EtOH use and recreational drug use. FAMILY HISTORY:  The patient's father and brother both were diagnosed  with carcinoma of the prostate and mother with kidney cancer. MEDICATIONS:  Please refer to chart. SOCIOECONOMIC HISTORY:  The patient does smoke cigarette. There is  history of EtOH use and the patient also uses recreational drugs. There  is no history of IV drug abuse. SURGERIES:  The patient has had knee surgery done and also surgery on  his testicle. ALLERGIES:  No known drug allergies. PHYSICAL EXAMINATION:  GENERAL:  Shows a 68-year Afro-American gentleman of average build and  fair nutritional status, who is lying flat in bed, in no acute distress. He is awake, alert and oriented and pleasant to talk with. VITAL SIGNS:  Stable. HEENT:  Examination shows skull to be atraumatic. NECK:  Supple. CHEST:  Shows diminished breath sounds. Rama Rg HEART:  S1, S2 is normal.  ABDOMEN:  Soft, nontender, and nondistended. Liver and spleen are not  palpable. RECTAL:  Exam is deferred. CNS:  Exam shows the patient to be awake, alert and oriented. There are  no focal sensorimotor sign. MUSCULOSKELETAL:  Exam shows evidence of degenerative joint disease  changes. Rama Rg LABORATORY DATA:  As above mentioned. IMPRESSION:  A 78-year-old Afro American gentleman presents with 3-week  history of diarrheal illness along with generalized weakness, debility  and dehydration and is noted to be anemic also with no evidence of gross  GI bleeding.   However, occult GI bleeding lesion cannot be ruled out,  source upper versus lower gastrointestinal tract. RECOMMENDATIONS:  1. Agree with present management. 2.  We will check the patient's CBC, Chem profile, PT/PTT, INR in a.m.  3.  We will follow up on stool studies. 6.  Transfuse on a p.r.n. basis to keep hemoglobin above 7 g%. 7.  The patient will need an EGD and colonoscopy for workup of his  anemia as an outpatient in fact once his global condition improves. 8.  Small bowel evaluation later if needed. 9.  We will also get a CAT scan of the abdomen and pelvis for further  workup of his abdominal symptoms and anemia. 10.  The case and plan has been discussed in detail with the patient and  all his questions have been answered. 11. The case and plan was also discussed with the patient's bedside MIKKI Blandon. Marlee Mckinnon MD    D: 11/05/2022 8:15:13       T: 11/05/2022 8:18:13     [id number]  Job#: [id number]     Doc#: [id number]    CC:   Jodi Faustin

## 2022-11-05 NOTE — CONSULTS
INPATIENT CARDIOLOGY CONSULT NOTE         Reason for consultation:  hx of CAD/CHF    Referring physician:  Arianne Dubon MD     Primary care physician: Kojo Royal MD      Dear Arianne Dubon MD Thank you for the consult    Chief Complaint   Patient presents with    Fatigue    Dehydration       History of present illness:Thien is a 71 y. o.year old who  presents with   Chief Complaint   Patient presents with    Fatigue    Dehydration     Patient is 60-year-old -American male with prior medical history significant for coronary disease s/p PCI to left circumflex artery 2015, who follows up with doctors at [facility], history of peripheral edema, tobacco abuse, essential hypertension presented to the hospital with generalized weakness. Patient denies any specific chest pain or shortness of breath. He complains of occasional diarrhea and mild abdominal pain. In the ER patient was noted to have creatinine of 1.2 magnesium 1.7 cardiac troponin 0.01. Cardiology consulted to evaluate patient for congestive heart failure with elevated proBNP 863         Past medical history:    has a past medical history of Arthritis, Hypertension, and MI, old. Past surgical history:   has a past surgical history that includes Cardiac catheterization and Testicle surgery. Social History:   reports that he has been smoking cigarettes. He has never used smokeless tobacco. He reports current alcohol use. He reports that he does not use drugs.   Family history:   no family history of CAD, STROKE of DM    No Known Allergies    sodium chloride flush 0.9 % injection 5-40 mL, 2 times per day  sodium chloride flush 0.9 % injection 5-40 mL, PRN  0.9 % sodium chloride infusion, PRN  enoxaparin Sodium (LOVENOX) injection 30 mg, BID  ondansetron (ZOFRAN-ODT) disintegrating tablet 4 mg, Q8H PRN   Or  ondansetron (ZOFRAN) injection 4 mg, Q6H PRN  polyethylene glycol (GLYCOLAX) packet 17 g, Daily PRN  acetaminophen (TYLENOL) tablet 650 mg, Q6H PRN   Or  acetaminophen (TYLENOL) suppository 650 mg, Q6H PRN  potassium chloride (KLOR-CON M) extended release tablet 40 mEq, PRN   Or  potassium bicarb-citric acid (EFFER-K) effervescent tablet 40 mEq, PRN   Or  potassium chloride 10 mEq/100 mL IVPB (Peripheral Line), PRN  magnesium sulfate 2000 mg in 50 mL IVPB premix, PRN  famotidine (PEPCID) tablet 20 mg, BID  cefTRIAXone (ROCEPHIN) 1,000 mg in dextrose 5 % 50 mL IVPB mini-bag, Q24H  0.9 % sodium chloride infusion, Continuous  atorvastatin (LIPITOR) tablet 40 mg, Nightly  amLODIPine (NORVASC) tablet 5 mg, Daily  aspirin chewable tablet 81 mg, Daily  clopidogrel (PLAVIX) tablet 75 mg, Daily  metoprolol tartrate (LOPRESSOR) tablet 12.5 mg, BID  lisinopril (PRINIVIL;ZESTRIL) tablet 10 mg, Daily      Current Facility-Administered Medications   Medication Dose Route Frequency Provider Last Rate Last Admin    sodium chloride flush 0.9 % injection 5-40 mL  5-40 mL IntraVENous 2 times per day Keshawn Baum MD   10 mL at 11/05/22 1403    sodium chloride flush 0.9 % injection 5-40 mL  5-40 mL IntraVENous PRN Keshawn Baum MD        0.9 % sodium chloride infusion   IntraVENous PRN Keshawn Baum MD        enoxaparin Sodium (LOVENOX) injection 30 mg  30 mg SubCUTAneous BID Keshawn Baum MD   30 mg at 11/05/22 0904    ondansetron (ZOFRAN-ODT) disintegrating tablet 4 mg  4 mg Oral Q8H PRN Keshawn Baum MD        Or    ondansetron [facility]) injection 4 mg  4 mg IntraVENous Q6H PRN Keshawn Baum MD        polyethylene glycol (GLYCOLAX) packet 17 g  17 g Oral Daily PRN Keshawn Baum MD        acetaminophen (TYLENOL) tablet 650 mg  650 mg Oral Q6H PRN Keshawn Baum MD   650 mg at 11/05/22 6217    Or    acetaminophen (TYLENOL) suppository 650 mg  650 mg Rectal Q6H PRN Keshawn Baum MD        potassium chloride (KLOR-CON M) extended release tablet 40 mEq 40 mEq Oral PRN Talib Curry MD        Or    potassium bicarb-citric acid (EFFER-K) effervescent tablet 40 mEq  40 mEq Oral PRN Talib Curry MD        Or    potassium chloride 10 mEq/100 mL IVPB (Peripheral Line)  10 mEq IntraVENous PRN Talib Curry MD        magnesium sulfate 2000 mg in 50 mL IVPB premix  2,000 mg IntraVENous PRN Talib Curry MD        famotidine (PEPCID) tablet 20 mg  20 mg Oral BID Talib Curry MD   20 mg at 11/05/22 0904    cefTRIAXone (ROCEPHIN) 1,000 mg in dextrose 5 % 50 mL IVPB mini-bag  1,000 mg IntraVENous Q24H John Bermeo MD   Stopped at 11/05/22 0938    0.9 % sodium chloride infusion   IntraVENous Continuous John Bermeo MD 50 mL/hr at 11/05/22 0858 New Bag at 11/05/22 0858    atorvastatin (LIPITOR) tablet 40 mg  40 mg Oral Nightly John Bermeo MD        amLODIPine (NORVASC) tablet 5 mg  5 mg Oral Daily John Bermeo MD   5 mg at 11/05/22 1533    aspirin chewable tablet 81 mg  81 mg Oral Daily John Bermeo MD   81 mg at 11/05/22 1533    clopidogrel (PLAVIX) tablet 75 mg  75 mg Oral Daily John Bermeo MD   75 mg at 11/05/22 1533    metoprolol tartrate (LOPRESSOR) tablet 12.5 mg  12.5 mg Oral BID Sandra Beltre MD        lisinopril (PRINIVIL;ZESTRIL) tablet 10 mg  10 mg Oral Daily John Bermeo MD   10 mg at 11/05/22 1532         Review of Systems:      Constitutional: No Fever or Weight Loss   Eyes: No Decreased Vision  ENT: No Headaches, Hearing Loss or Vertigo  Cardiovascular:    no chest pain,   no dyspnea on exertion,   no palpitations or loss of consciousness  Respiratory: No cough or wheezing    Gastrointestinal: No abdominal pain, appetite loss, blood in stools, constipation, diarrhea or heartburn  Genitourinary: No dysuria, trouble voiding, or hematuria  Musculoskeletal:  No gait disturbance, weakness or joint complaints  Integumentary: No rash or pruritis  Neurological: No TIA or stroke symptoms  Psychiatric: No anxiety or depression  Endocrine: No malaise, fatigue or temperature intolerance  Hematologic/Lymphatic: No bleeding problems, blood clots or swollen lymph nodes  Allergic/Immunologic: No nasal congestion or hives    All other systems were reviewed and were negative otherwise. Physical Examination:      Vitals:    11/05/22 1515   BP: 135/62   Pulse: 93   Resp: 16   Temp: 98.4 °F (36.9 °C)   SpO2:       Wt Readings from Last 3 Encounters:   11/05/22 293 lb 3.4 oz (133 kg)   07/11/20 (!) 310 lb (140.6 kg)     Body mass index is 36.65 kg/m². General Appearance:  No distress, conversant  Constitutional:  Well developed, Well nourished  HEENT:  Normocephalic, Atraumatic, Oropharynx moist   Nose normal. Neck Supple Carotid: no carotid bruit  Eyes:  Conjunctiva normal, No discharge. Respiratory:     Normal breath sounds, No respiratory distress, No wheezing, no use of accessory muscles, diaphragm movement is normal  No chest Tenderness  Cardiovascular: S1-S2 No  murmurs auscultated. No rubs, thrills or gallops. Normal   rhythm. Pedal pulses are normal. No pedal edema  GI:  Soft Non tender, non distended. Musculoskeletal:   No tenderness, No cyanosis, No clubbing. Integument:  Warm, Dry, No erythema, No rash. Lymphatic:  No lymphadenopathy noted. Neurologic:   Alert & oriented x 3  No focal deficits noted. Psychiatric:  Affect normal, Judgment normal, Mood normal.       Lab Review     Recent Labs     11/05/22  0855   WBC 15.6*   HGB 8.5*   HCT 26.6*         Recent Labs     11/05/22  0855   *   K 4.1   CL 98*   CO2 20*   BUN 8   CREATININE 1.2     Recent Labs     11/05/22  0855   AST 27   ALT 26   BILITOT 1.3*   ALKPHOS 116     No results for input(s): TROPONINI in the last 72 hours.   No results found for: BNP  No results found for: INR, PROTIME      All labs, images, EKGs were personally reviewed      Assessment: 71 y.o.year old with PMH of  has a past medical history of Arthritis, Hypertension, and MI, old. Medical Decision Making :       History of non-STEMI/CAD/  s/p PCI, left circumflex, 2015, Dr. Charlie [facility]ison, [facility]  Essential hypertension  Hyperlipidemia  Elevated proBNP however patient clinically dehydrated and currently on IV fluids.   Continue    Patient denies any active chest pain  Complains of shortness of breath with exertion  Continue with aspirin 81 mg daily  Continue with Plavix 25 mg daily  Continue with metoprolol tartrate 12.5 mg twice daily  Continue lisinopril 10 mg p.o. daily  Continue with Lipitor 40 mg daily  Continue with Norvasc 5 mg p.o. daily    Stress MPI/echocardiogram Monday    Urinary tract infection: IV antibiotics  Hypomagnesemia: Replete magnesium  Anemia/diarrhea: GI follow-up IV fluids  Tobacco abuse: Counseled against smoking  Morbid obesity: Advised low-fat diet and exercise as tolerated      Thank you for the consult    Dr. Janet Shepherd  11/5/2022 7:25 PM

## 2022-11-05 NOTE — PROGRESS NOTES
Hospitalist Progress Note      Name:  Fiorella Hernández /Age/Sex: 1950  (71 y.o. male)   MRN & CSN:  [id number] & [id number] Admission Date/Time: 2022  4:44 PM   Location:  [address] [place] [address] PCP: Chato Nair MD         Hospital Day: 2    Assessment and Plan:   Fiorella Hernández is a 71 y.o.  male  who presents with Debility      UTI - Fever. Leucocytosis. Dirty urine. Culture pending. LA - negative. Blood culture pending. Started on Ceftriaxone. Diarrhea - Mostly watery. Stool work up pending. CT abdomen. No recent Abx use. Dehydration - Generalized fatigue. IV hydration    Elevated BNP - Reports history of 2 stents. Unaware of HF but takes lasix for dependent edema. Chest xray - vascular congestion. Currently saturating well in RA. ECHO. Hold off on lasix. Cardiology consulted. Hypovolemic Hyponatremia. Sodium 128. Likely from poor oral intake. Patient is getting IV NS. Will repeat q12 for now. Hypomagnesemia Magnesium 1.7. Replete and recheck    Anemia: Hemoglobin 8.9. No active bleeding reported. Hemoglobin was 13.one year back. Iron indices ordered as well. GI consulted. EGD / Colonoscopy as OP. CT abdomen ordered - pending. FOBT and iron panel pending    CAD s/p PCI of the left circumflex coronary artery in  - stable. No chest pain. Trop negative. HTN - continue home meds    Diet ADULT DIET;  Regular   DVT Prophylaxis [] Lovenox, []  Heparin, [] SCDs, [] Ambulation   GI Prophylaxis [] PPI,  [] H2 Blocker,  [] Carafate,  [] Diet/Tube Feeds   Code Status Full Code   Disposition Patient requires continued admission due to ongoing diarrhea    MDM [] Low, [] Moderate,[]  High  Patient's risk as above due to multiple comorb     History of Present Illness:     Chief Complaint: Lorine Kanner is a 71 y.o.  male  who presents with PMH NSTEMI, CAD s/p PCI of the left circumflex coronary artery in , hypertension, dependent peripheral edema, and tobacco use     Patient states that for the past week or so he has been feeling very weak. Denies any specific complaints like chest pain or abdominal pain. Denies any fever chills or rigors. Denies any nausea vomiting. Does concede to having diarrhea in the past few weeks and was given Lomotil by the family doctor which provided him some relief. Denies any hemoptysis hematemesis melena hematochezia. In the ER his serum chemistries revealed sodium of 128 potassium 4.2 chloride 95 bicarb 21 blood 09 creatinine 1.2 anion gap 12 magnesium 1.7 random glucose 119 serumcalcium is 9, total protein 7.3. proBNP 863. Troponin T less than 0.010. Liver function profile shows albumin 3.2 alkaline phosphatase at 10 ALT 31 AST 26 bilirubin 10 lipase 19 total protein 7.3. Hematology consult WBC 14.6 hemoglobin 8.9 hematocrit 27.8 and platelet count of 933. X-ray chest without any acute process. Ten point ROS reviewed negative, unless as noted above    Objective: Intake/Output Summary (Last 24 hours) at 11/5/2022 1421  Last data filed at 11/5/2022 0003  Gross per 24 hour   Intake --   Output 375 ml   Net -375 ml      Vitals:   Vitals:    11/05/22 0800   BP: 134/68   Pulse: 94   Resp: 16   Temp: (!) 100.6 °F (38.1 °C)   SpO2: 91%     Physical Exam:   GEN Awake male, sitting upright in bed in no apparent distress. Appears given age. EYES Pupils are equally round. No scleral erythema, discharge, or conjunctivitis. HENT Mucous membranes are moist. Oral pharynx without exudates, no evidence of thrush. NECK Supple, no apparent thyromegaly or masses. RESP Clear to auscultation, no wheezes, rales or rhonchi. Symmetric chest movement while on room air. CARDIO/VASC S1/S2 auscultated. Regular rate without appreciable murmurs, rubs, or gallops. No JVD or carotid bruits. Peripheral pulses equal bilaterally and palpable. No peripheral edema. GI Abdomen is soft without significant tenderness, masses, or guarding. Bowel sounds are normoactive.  Rectal exam deferred.  No costovertebral angle tenderness. Normal appearing external genitalia. Conte catheter is not present. HEME/LYMPH No palpable cervical lymphadenopathy and no hepatosplenomegaly. No petechiae or ecchymoses. MSK No gross joint deformities. SKIN Normal coloration, warm, dry. NEURO Cranial nerves appear grossly intact, normal speech, no lateralizing weakness. PSYCH Awake, alert, oriented x 4. Affect appropriate. Medications:   Medications:    sodium chloride flush  5-40 mL IntraVENous 2 times per day    enoxaparin  30 mg SubCUTAneous BID    famotidine  20 mg Oral BID    cefTRIAXone (ROCEPHIN) IV  1,000 mg IntraVENous Q24H      Infusions:    sodium chloride      sodium chloride 50 mL/hr at 11/05/22 0858     PRN Meds: sodium chloride flush, 5-40 mL, PRN  sodium chloride, , PRN  ondansetron, 4 mg, Q8H PRN   Or  ondansetron, 4 mg, Q6H PRN  polyethylene glycol, 17 g, Daily PRN  acetaminophen, 650 mg, Q6H PRN   Or  acetaminophen, 650 mg, Q6H PRN  potassium chloride, 40 mEq, PRN   Or  potassium alternative oral replacement, 40 mEq, PRN   Or  potassium chloride, 10 mEq, PRN  magnesium sulfate, 2,000 mg, PRN          Patient is still admitted because intractable diarrhea . The anticipated discharge is in less than 24 hours.      Electronically signed by Ruddy Joseph MD on 11/5/2022 at 2:21 PM

## 2022-11-05 NOTE — H&P
History and Physical      Name:  Santos Calhoun /Age/Sex: 1950  (71 y.o. male)   MRN & CSN:  [id number] & [id number] Admission Date/Time: 2022  4:44 PM   Location:  ED25/ED-25 PCP: Denita Beyer MD       Hospital Day: 1    Assessment and Plan:   Santos Calhoun is a 71 y.o.  male  who presents with Debility    Debility  -Profound, unable to perform ADLs  -Influenza a and B are negative and so a SARS COVID. -PT OT to evaluate and assess. Dehydration  -Probably secondary to diarrhea. -Gentle IV hydration and monitor. Diarrhea  -Etiology not known.  -Patient was given Lomotil by the PCP and had some relief. -GI PCR panel awaited. -Monitor and hold off on starting any antibiotics as yet. Hyponatremia.  -Sodium 128  -Gentle hydration with LR and monitor. Hypomagnesemia  -Magnesium 1.7  -Replete and recheck    Anemia  -Hemoglobin 8.9 with hematocrit 27.8.  -Hemoglobin was 13.1,2 years ago. -Iron indices ordered as well. -We will consult GI    Diet No diet orders on file   DVT Prophylaxis [] Lovenox, []  Heparin, [] SCDs, [] Ambulation   GI Prophylaxis [] PPI,  [] H2 Blocker,  [] Carafate,  [x] Diet/Tube Feeds   Code Status No Order   Disposition Patient requires continued admission due to profound debility   MDM [] Low, [] Moderate,[x]  High  Patient's risk as above due to profound debility     History of Present Illness:     Chief Complaint: Khushboo Nguyen is a 71 y.o.  male  who presents with profound debility and inability to carry out his ADLs. Patient states that for the past week or so he has been feeling very weak. Denies any specific complaints like chest pain or abdominal pain. Denies any fever chills or rigors. Denies any nausea vomiting. Does concede to having diarrhea in the past few weeks and was given Lomotil by the family doctor which provided him some relief. Denies any hemoptysis hematemesis melena hematochezia.   In the ER his serum chemistries revealed sodium of 128 potassium 4.2 chloride 95 bicarb 21 blood 09 creatinine 1.2 anion gap 12 magnesium 1.7 random glucose 119 serumcalcium is 9, total protein 7.3. proBNP 863. Troponin T less than 0.010. Liver function profile shows albumin 3.2 alkaline phosphatase at 10 ALT 31 AST 26 bilirubin 10 lipase 19 total protein 7.3. Hematology consult WBC 14.6 hemoglobin 8.9 hematocrit 27.8 and platelet count of 066. X-ray chest without any acute process. Patient will be admitted to the hospitalist service. Continue gentle IV hydration. Orthostatic vital signs. PT OT to assess. GI to assess for anemia as inpatient or outpatient would leave that to their discretion. Ten point ROS reviewed negative, unless as noted above    Objective:   No intake or output data in the 24 hours ending 11/04/22 2310   Vitals:   Vitals:    11/04/22 2243   BP: 137/79   Pulse: 88   Resp: 15   Temp: 98.3 °F (36.8 °C)   SpO2: 94%     Physical Exam:   GEN Awake male, sitting upright in bed in no apparent distress. Appears given age. EYES Pupils are equally round. No scleral erythema, discharge, or conjunctivitis. HENT Mucous membranes are moist. Oral pharynx without exudates, no evidence of thrush. NECK Supple, no apparent thyromegaly or masses. RESP Clear to auscultation, no wheezes, rales or rhonchi. Symmetric chest movement while on room air. CARDIO/VASC S1/S2 auscultated. Regular rate without appreciable murmurs, rubs, or gallops. No JVD or carotid bruits. Peripheral pulses equal bilaterally and palpable. No peripheral edema. GI Abdomen is soft without significant tenderness, masses, or guarding. Bowel sounds are normoactive. Rectal exam deferred.  No costovertebral angle tenderness. Normal appearing external genitalia. Conte catheter is not present. HEME/LYMPH No palpable cervical lymphadenopathy and no hepatosplenomegaly. No petechiae or ecchymoses. MSK No gross joint deformities.   SKIN Normal coloration, warm, dry.  NEURO Cranial nerves appear grossly intact, normal speech, no lateralizing weakness. PSYCH Awake, alert, oriented x 4. Affect appropriate. Past Medical History:      Past Medical History:   Diagnosis Date    Arthritis     Hypertension     MI, old      PSHX:  has a past surgical history that includes Cardiac catheterization and Testicle surgery. Allergies: No Known Allergies    FAM HX: family history is not on file.   Soc HX:   Social History     Socioeconomic History    Marital status:      Spouse name: None    Number of children: None    Years of education: None    Highest education level: None   Tobacco Use    Smoking status: Some Days     Types: Cigarettes    Smokeless tobacco: Never   Substance and Sexual Activity    Alcohol use: Yes     Comment: rarely    Drug use: Never       Data:   CBC with Differential:    Lab Results   Component Value Date/Time    WBC 14.6 11/04/2022 04:56 PM    RBC 2.83 11/04/2022 04:56 PM    HGB 8.9 11/04/2022 04:56 PM    HCT 27.8 11/04/2022 04:56 PM     11/04/2022 04:56 PM    MCV 98.2 11/04/2022 04:56 PM    MCH 31.4 11/04/2022 04:56 PM    MCHC 32.0 11/04/2022 04:56 PM    RDW 14.6 11/04/2022 04:56 PM    SEGSPCT 55.5 08/25/2020 01:57 PM    LYMPHOPCT 30.0 08/25/2020 01:57 PM    MONOPCT 8.3 08/25/2020 01:57 PM    BASOPCT 0.7 08/25/2020 01:57 PM    MONOSABS 0.6 08/25/2020 01:57 PM    LYMPHSABS 2.2 08/25/2020 01:57 PM    EOSABS 0.4 08/25/2020 01:57 PM    BASOSABS 0.1 08/25/2020 01:57 PM    DIFFTYPE AUTOMATED DIFFERENTIAL 08/25/2020 01:57 PM       CMP:     Lab Results   Component Value Date/Time     11/04/2022 04:56 PM    K 4.2 11/04/2022 04:56 PM    CL 95 11/04/2022 04:56 PM    CO2 21 11/04/2022 04:56 PM    BUN 9 11/04/2022 04:56 PM    CREATININE 1.2 11/04/2022 04:56 PM    GFRAA >60 07/11/2020 11:20 AM    LABGLOM >60 11/04/2022 04:56 PM    GLUCOSE 119 11/04/2022 04:56 PM    PROT 7.3 11/04/2022 04:56 PM    LABALBU 3.2 11/04/2022 04:56 PM    CALCIUM 9.0

## 2022-11-05 NOTE — ED PROVIDER NOTES
Emergency Department Encounter    Patient: Howie Garcia  MRN: [id number]  : 1950  Date of Evaluation: 2022  ED Provider:  Robina Kruger DO    Triage Chief Complaint:   Fatigue and Dehydration    Hughes:  Howie Garcia is a 71 y.o. male that presents to the emergency department complaining of increased fatigue increased thirst and diarrhea that started last week. Patient states he did call his primary care physician and was placed on Tylenol and Imodium. He states the diarrhea has improved. He states decreased p.o. intake for the past week as well. Patient states no chest pain with some intermittent shortness of breath with exertion. He denies any nausea vomiting diarrhea no abdominal pain no dysuria hematuria no fevers. He states he had chills on and off for the past week no cough. Patient states dizziness and lightheadedness worse with standing up. Patient states he had decreased sleep no syncopal episodes. Patient states he just started feeling worse he does live alone brought in via EMS today. He states he does have hypertension hyperlipidemia heart disease 2 stents history of heart attack. States no sore throat runny nose earache. Patient here for evaluation.     ROS - see HPI, below listed is current ROS at time of my eval:  General:  No fevers, no chills, positive for generalized weakness, fatigue  Eyes:  No recent vison changes, no discharge  ENT:  No sore throat, no nasal congestion, no hearing changes  Cardiovascular:  No chest pain, no palpitations  Respiratory:  No shortness of breath, no cough, no wheezing  Gastrointestinal:  No pain, no nausea, no vomiting, no diarrhea  Musculoskeletal:  No muscle pain, no joint pain  Skin:  No rash, no pruritis, no easy bruising  Neurologic:  No speech problems, no headache, no extremity numbness, no extremity tingling, no extremity weakness  Psychiatric:  No anxiety  Genitourinary:  No dysuria, no hematuria  Endocrine: Positive for anorexia, no unexpected weight gain, no unexpected weight loss  Extremities:  no edema, no pain    Past Medical History:   Diagnosis Date    Arthritis     Hypertension     MI, old      Past Surgical History:   Procedure Laterality Date    CARDIAC CATHETERIZATION      TESTICLE SURGERY       No family history on file. Social History     Socioeconomic History    Marital status:      Spouse name: Not on file    Number of children: Not on file    Years of education: Not on file    Highest education level: Not on file   Occupational History    Not on file   Tobacco Use    Smoking status: Some Days     Types: Cigarettes    Smokeless tobacco: Never   Substance and Sexual Activity    Alcohol use: Yes     Comment: rarely    Drug use: Never    Sexual activity: Not on file   Other Topics Concern    Not on file   Social History Narrative    Not on file     Social Determinants of Health     Financial Resource Strain: Not on file   Food Insecurity: Not on file   Transportation Needs: Not on file   Physical Activity: Not on file   Stress: Not on file   Social Connections: Not on file   Intimate Partner Violence: Not on file   Housing Stability: Not on file     No current facility-administered medications for this encounter. Current Outpatient Medications   Medication Sig Dispense Refill    ibuprofen (IBU) 400 MG tablet Take 1 tablet by mouth every 6 hours as needed for Pain 30 tablet 0     No Known Allergies    Nursing Notes Reviewed    Physical Exam:  Triage VS:    ED Triage Vitals [11/04/22 1646]   Enc Vitals Group      /71      Heart Rate 88      Resp 18      Temp 98 °F (36.7 °C)      Temp Source Oral      SpO2 96 %      Weight (!) 310 lb (140.6 kg)      Height 6' 3\" (1.905 m)      Head Circumference       Peak Flow       Pain Score       Pain Loc       Pain Edu? Excl. in [address]? My pulse ox interpretation is - normal    General appearance:  No acute distress. Skin:  Warm. Dry.    Eye:  Extraocular movements intact. Ears, nose, mouth and throat:  Oral mucosa moist   Neck:  Trachea midline. Extremity:  No swelling. Normal ROM     Heart:  Regular rate and rhythm, normal S1 & S2, no extra heart sounds. Perfusion:  intact  Respiratory:  Lungs clear to auscultation bilaterally. Respirations nonlabored. Abdominal: Morbidly obese abdomen, normal bowel sounds. Soft. Nontender. Non distended. Back:  No CVA tenderness to palpation     Neurological:  Alert and oriented times 3. No focal neuro deficits.              Psychiatric:  Appropriate    I have reviewed and interpreted all of the currently available lab results from this visit (if applicable):  Results for orders placed or performed during the hospital encounter of 11/04/22   COVID-19, Rapid    Specimen: Nasopharyngeal   Result Value Ref Range    Source UNKNOWN     SARS-CoV-2, NAAT NOT DETECTED NOT DETECTED   Rapid Flu Swab    Specimen: Nasopharyngeal   Result Value Ref Range    Rapid Influenza A Ag NEGATIVE NEGATIVE    Rapid Influenza B Ag NEGATIVE NEGATIVE   CBC   Result Value Ref Range    WBC 14.6 (H) 4.0 - 10.5 K/CU MM    RBC 2.83 (L) 4.6 - 6.2 M/CU MM    Hemoglobin 8.9 (L) 13.5 - 18.0 GM/DL    Hematocrit 27.8 (L) 42 - 52 %    MCV 98.2 78 - 100 FL    MCH 31.4 (H) 27 - 31 PG    MCHC 32.0 32.0 - 36.0 %    RDW 14.6 11.7 - 14.9 %    Platelets 947 (H) 481 - 440 K/CU MM    MPV 9.3 7.5 - 11.1 FL   Comprehensive Metabolic Panel   Result Value Ref Range    Sodium 128 (L) 135 - 145 MMOL/L    Potassium 4.2 3.5 - 5.1 MMOL/L    Chloride 95 (L) 99 - 110 mMol/L    CO2 21 21 - 32 MMOL/L    BUN 9 6 - 23 MG/DL    Creatinine 1.2 0.9 - 1.3 MG/DL    Est, Glom Filt Rate >60 >60 mL/min/1.73m2    Glucose 119 (H) 70 - 99 MG/DL    Calcium 9.0 8.3 - 10.6 MG/DL    Albumin 3.2 (L) 3.4 - 5.0 GM/DL    Total Protein 7.3 6.4 - 8.2 GM/DL    Total Bilirubin 1.0 0.0 - 1.0 MG/DL    ALT 31 10 - 40 U/L    AST 26 15 - 37 IU/L    Alkaline Phosphatase 110 40 - 129 IU/L    Anion Gap 12 4 - 16   Troponin   Result Value Ref Range    Troponin T <0.010 <0.01 NG/ML   Magnesium   Result Value Ref Range    Magnesium 1.7 (L) 1.8 - 2.4 mg/dl   Lipase   Result Value Ref Range    Lipase 19 13 - 60 IU/L   Brain Natriuretic Peptide   Result Value Ref Range    Pro-.5 (H) <300 PG/ML      Radiographs (if obtained):  Radiologist's Report Reviewed:  XR CHEST PORTABLE    Result Date: 11/4/2022  EXAMINATION: ONE XRAY VIEW OF THE CHEST 11/4/2022 7:20 pm COMPARISON: None. HISTORY: ORDERING SYSTEM PROVIDED HISTORY: Chest Pain TECHNOLOGIST PROVIDED HISTORY: Reason for exam:->Chest Pain Reason for Exam: CHEST PAIN Additional signs and symptoms: NA Relevant Medical/Surgical History: HYPERTENSION FINDINGS: Frontal portable view of the chest.  Normal lung volume. Eventration of the right hemidiaphragm. No focal airspace disease. Prominent pulmonary vasculature. No pleural effusion or pneumothorax. Cardiomegaly. Tortuous and atherosclerotic thoracic aorta. Multilevel degenerative disc disease. 1.  No focal airspace disease. 2.  Cardiomegaly and pulmonary vascular congestion. EKG (if obtained): (All EKG's are interpreted by myself in the absence of a cardiologist)      MDM:  Patient presents to the emergency department for generalized weakness, fatigue decreased p.o. intake some dizziness and lightheadedness shortness of breath diarrhea chills not feeling well. Laboratory studies were ordered patient with elevated white count of 14.6. Patient with anemia hemoglobin down to 8.9 unsure patient baseline. Patient elevated platelets 720. Patient sodium slightly low 128. Patient on potassium. Patient creatinine 1.2, BUN  . Patient glucose 119. Patient normal calcium negative troponin. Patient normal liver enzymes. Patient magnesium slightly low 1.7. Patient normal lipase. Patient .5. Patient negative for COVID and flu swabs. Chest x-ray no acute abnormality.   Did consult hospitalist for admission. I did recommend a GI panel which was ordered. They will be admitting for further evaluation treatment and management. Clinical Impression:  1. Generalized weakness    2. Dehydration    3. Hyponatremia    4. Anemia, unspecified type        ED Provider Disposition Time  DISPOSITION Decision To Admit 11/04/2022 10:38:56 PM      Comment: Please note this report has been produced using speech recognition software and may contain errors related to that system including errors in grammar, punctuation, and spelling, as well as words and phrases that may be inappropriate. Efforts were made to edit the dictations.         [employer], DO  11/07/22 0157

## 2022-11-05 NOTE — PROGRESS NOTES
4 Eyes Skin Assessment     NAME:  Marin Clancy  YOB: 1950  MEDICAL RECORD NUMBER:  [id number]    The patient is being assess for  Admission    I agree that 2 RN's have performed a thorough Head to Toe Skin Assessment on the patient. ALL assessment sites listed below have been assessed. Areas assessed by both nurses:    Head, Face, Ears, Shoulders, Back, Chest, Arms, Elbows, Hands, Sacrum. Buttock, Coccyx, Ischium, and Legs. Feet and Heels        Does the Patient have a Wound?  No noted wound(s)       Jesus Prevention initiated:  Yes   Wound Care Orders initiated:  NA    Pressure Injury (Stage 3,4, Unstageable, DTI, NWPT, and Complex wounds) if present place referral/consult order under [de-identified] No    New and Established Ostomies if present place consult order under : No      Nurse 1 eSignature: Electronically signed by Chai Dickerson on 11/5/22 at 1:56 AM EDT    **SHARE this note so that the co-signing nurse is able to place an eSignature**    Nurse 2 eSignature: Electronically signed by Dee Duncan RN on 11/5/22 at 5:04 AM EDT

## 2022-11-05 NOTE — ED NOTES
ED TO INPATIENT SBAR HANDOFF    Patient Name: Radha Quinn   :  1950  71 y.o. MRN:  [id number]  Preferred Name  Kathie Mcintosh  ED Room #:  ED25/ED-25  Family/Caregiver Present no   Restraints no   Sitter no   Sepsis Risk Score Sepsis Risk Score: 1.2    Situation  Code Status: No Order . Allergies: Patient has no known allergies. Weight: Patient Vitals for the past 96 hrs (Last 3 readings):   Weight   22 1646 (!) 310 lb (140.6 kg)     Arrived from: home  Chief Complaint:   Chief Complaint   Patient presents with    Fatigue    Dehydration     Hospital Problem/Diagnosis:  Active Problems:    * No active hospital problems. *  Resolved Problems:    * No resolved hospital problems. *    Imaging:   XR CHEST PORTABLE   Final Result   1. No focal airspace disease. 2.  Cardiomegaly and pulmonary vascular congestion.            Abnormal labs:   Abnormal Labs Reviewed   CBC - Abnormal; Notable for the following components:       Result Value    WBC 14.6 (*)     RBC 2.83 (*)     Hemoglobin 8.9 (*)     Hematocrit 27.8 (*)     MCH 31.4 (*)     Platelets 439 (*)     All other components within normal limits   COMPREHENSIVE METABOLIC PANEL - Abnormal; Notable for the following components:    Sodium 128 (*)     Chloride 95 (*)     Glucose 119 (*)     Albumin 3.2 (*)     All other components within normal limits   MAGNESIUM - Abnormal; Notable for the following components:    Magnesium 1.7 (*)     All other components within normal limits   BRAIN NATRIURETIC PEPTIDE - Abnormal; Notable for the following components:    Pro-.5 (*)     All other components within normal limits     Critical values: No    Abnormal Assessment Findings:     Background  History:   Past Medical History:   Diagnosis Date    Arthritis     Hypertension     MI, old        Assessment    Vitals/MEWS: MEWS Score: 1  Level of Consciousness: Alert (0)   Vitals:    22 1646 22 2243   BP: 103/71 137/79   Pulse: 88 88   Resp: 18 15 Temp: 98 °F (36.7 °C) 98.3 °F (36.8 °C)   TempSrc: Oral Oral   SpO2: 96% 94%   Weight: (!) 310 lb (140.6 kg)    Height: 6' 3\" (1.905 m)      FiO2 (%):   O2 Flow Rate: O2 Device: None (Room air)    Cardiac Rhythm:    Pain Assessment:  [] Verbal [] Celso Jatin Scale  Pain Scale: Pain Assessment  Pain Assessment: 0-10  Pain Level: 0  Patient's Stated Pain Goal: 0 - No pain  Last documented pain score (0-10 scale) Pain Level: 0  Last documented pain medication administered:   Mental Status: alert  NIH Score:    C-SSRS: Risk of Suicide: No Risk  Bedside swallow:    [place] Coma Scale (GCS): Active LDA's:   Peripheral IV 11/04/22 Left Antecubital (Active)   Site Assessment Clean, dry & intact 11/04/22 1657   Line Status Blood return noted 11/04/22 1657   Phlebitis Assessment No symptoms 11/04/22 1657   Infiltration Assessment 0 11/04/22 1657   Alcohol Cap Used No 11/04/22 1657     PO Status: Regular  Pertinent or High Risk Medications/Drips: no   o If Yes, please provide details:   Pending Blood Product Administration: no     You may also review the ED PT Care Timeline found under the Summary Nursing Index tab. Recommendation    Pending orders   Plan for Discharge (if known):    Additional Comments:    If any further questions, please call Sending RN at [phone]    Electronically signed by: Electronically signed by Luann Jimenez RN on 11/4/2022 at 10:45 PM       Luann Jimenez RN  11/04/22 0763

## 2022-11-06 LAB
ADENOVIRUS F 40 41 PCR: NOT DETECTED
ALBUMIN SERPL-MCNC: 2.8 GM/DL (ref 3.4–5)
ALP BLD-CCNC: 111 IU/L (ref 40–128)
ALT SERPL-CCNC: 28 U/L (ref 10–40)
AMYLASE: 98 U/L (ref 25–115)
ANION GAP SERPL CALCULATED.3IONS-SCNC: 11 MMOL/L (ref 4–16)
APTT: 42.6 SECONDS (ref 25.1–37.1)
AST SERPL-CCNC: 40 IU/L (ref 15–37)
ASTROVIRUS PCR: NOT DETECTED
BASOPHILS ABSOLUTE: 0 K/CU MM
BASOPHILS RELATIVE PERCENT: 0.4 % (ref 0–1)
BILIRUB SERPL-MCNC: 1.1 MG/DL (ref 0–1)
BUN BLDV-MCNC: 9 MG/DL (ref 6–23)
C DIFF AG + TOXIN: NORMAL
C-REACTIVE PROTEIN, HIGH SENSITIVITY: 167.5 MG/L
CALCIUM SERPL-MCNC: 8 MG/DL (ref 8.3–10.6)
CAMPYLOBACTER PCR: NOT DETECTED
CHLORIDE BLD-SCNC: 103 MMOL/L (ref 99–110)
CO2: 20 MMOL/L (ref 21–32)
CREAT SERPL-MCNC: 1 MG/DL (ref 0.9–1.3)
CRYPTOSPORIDIUM PCR: NOT DETECTED
CYCLOSPORA CAYETANENSIS PCR: NOT DETECTED
DIFFERENTIAL TYPE: ABNORMAL
E COLI 0157 PCR: NOT DETECTED
E COLI ENTEROAGGREGATIVE PCR: NOT DETECTED
E COLI ENTEROPATHOGENIC PCR: NOT DETECTED
E COLI ENTEROTOXIGENIC PCR: NOT DETECTED
E COLI SHIGA LIKE TOXIN PCR: NOT DETECTED
E COLI SHIGELLA/ENTEROINVASIVE PCR: NOT DETECTED
ENTAMOEBA HISTOLYTICA PCR: NOT DETECTED
EOSINOPHILS ABSOLUTE: 0.1 K/CU MM
EOSINOPHILS RELATIVE PERCENT: 0.9 % (ref 0–3)
FERRITIN: 537 NG/ML (ref 30–400)
GFR SERPL CREATININE-BSD FRML MDRD: >60 ML/MIN/1.73M2
GIARDIA LAMBLIA PCR: NOT DETECTED
GLUCOSE BLD-MCNC: 119 MG/DL (ref 70–99)
HCT VFR BLD CALC: 25.6 % (ref 42–52)
HEMOCCULT SP1 STL QL: NEGATIVE
HEMOGLOBIN: 8.1 GM/DL (ref 13.5–18)
IMMATURE NEUTROPHIL %: 0.4 % (ref 0–0.43)
INR BLD: 1.27 INDEX
IRON: 19 UG/DL (ref 59–158)
LACTOFERRIN, QUAL: POSITIVE
LIPASE: 40 IU/L (ref 13–60)
LYMPHOCYTES ABSOLUTE: 1.3 K/CU MM
LYMPHOCYTES RELATIVE PERCENT: 12.1 % (ref 24–44)
MCH RBC QN AUTO: 31.3 PG (ref 27–31)
MCHC RBC AUTO-ENTMCNC: 31.6 % (ref 32–36)
MCV RBC AUTO: 98.8 FL (ref 78–100)
MONOCYTES ABSOLUTE: 0.9 K/CU MM
MONOCYTES RELATIVE PERCENT: 8.3 % (ref 0–4)
NOROVIRUS GI GII PCR: NOT DETECTED
NUCLEATED RBC %: 0 %
PCT TRANSFERRIN: 14 % (ref 10–44)
PDW BLD-RTO: 14.9 % (ref 11.7–14.9)
PLATELET # BLD: 384 K/CU MM (ref 140–440)
PLESIOMONAS SHIGELLOIDES PCR: NOT DETECTED
PMV BLD AUTO: 9.1 FL (ref 7.5–11.1)
POTASSIUM SERPL-SCNC: 3.6 MMOL/L (ref 3.5–5.1)
PROCALCITONIN: 19.24
PROTHROMBIN TIME: 16.4 SECONDS (ref 11.7–14.5)
RBC # BLD: 2.59 M/CU MM (ref 4.6–6.2)
ROTAVIRUS A PCR: NOT DETECTED
SALMONELLA PCR: NOT DETECTED
SAPOVIRUS PCR: NOT DETECTED
SEGMENTED NEUTROPHILS ABSOLUTE COUNT: 8.5 K/CU MM
SEGMENTED NEUTROPHILS RELATIVE PERCENT: 77.9 % (ref 36–66)
SODIUM BLD-SCNC: 134 MMOL/L (ref 135–145)
SOURCE: NORMAL
TOTAL IMMATURE NEUTOROPHIL: 0.04 K/CU MM
TOTAL IRON BINDING CAPACITY: 137 UG/DL (ref 250–450)
TOTAL NUCLEATED RBC: 0 K/CU MM
TOTAL PROTEIN: 6 GM/DL (ref 6.4–8.2)
TROPONIN T: <0.01 NG/ML
UNSATURATED IRON BINDING CAPACITY: 118 UG/DL (ref 110–370)
VIBRIO CHOLERAE PCR: NOT DETECTED
VIBRIO PCR: NOT DETECTED
WBC # BLD: 10.9 K/CU MM (ref 4–10.5)
YERSINIA ENTEROCOLITICA PCR: NOT DETECTED

## 2022-11-06 PROCEDURE — 6370000000 HC RX 637 (ALT 250 FOR IP): Performed by: NURSE PRACTITIONER

## 2022-11-06 PROCEDURE — 6360000002 HC RX W HCPCS: Performed by: HOSPITALIST

## 2022-11-06 PROCEDURE — 6370000000 HC RX 637 (ALT 250 FOR IP): Performed by: HOSPITALIST

## 2022-11-06 PROCEDURE — 6370000000 HC RX 637 (ALT 250 FOR IP): Performed by: STUDENT IN AN ORGANIZED HEALTH CARE EDUCATION/TRAINING PROGRAM

## 2022-11-06 PROCEDURE — 6360000002 HC RX W HCPCS: Performed by: STUDENT IN AN ORGANIZED HEALTH CARE EDUCATION/TRAINING PROGRAM

## 2022-11-06 PROCEDURE — 36415 COLL VENOUS BLD VENIPUNCTURE: CPT

## 2022-11-06 PROCEDURE — 85025 COMPLETE CBC W/AUTO DIFF WBC: CPT

## 2022-11-06 PROCEDURE — 99233 SBSQ HOSP IP/OBS HIGH 50: CPT | Performed by: INTERNAL MEDICINE

## 2022-11-06 PROCEDURE — 2580000003 HC RX 258: Performed by: STUDENT IN AN ORGANIZED HEALTH CARE EDUCATION/TRAINING PROGRAM

## 2022-11-06 PROCEDURE — 82150 ASSAY OF AMYLASE: CPT

## 2022-11-06 PROCEDURE — 84145 PROCALCITONIN (PCT): CPT

## 2022-11-06 PROCEDURE — 84484 ASSAY OF TROPONIN QUANT: CPT

## 2022-11-06 PROCEDURE — 85730 THROMBOPLASTIN TIME PARTIAL: CPT

## 2022-11-06 PROCEDURE — 83690 ASSAY OF LIPASE: CPT

## 2022-11-06 PROCEDURE — 83550 IRON BINDING TEST: CPT

## 2022-11-06 PROCEDURE — 85610 PROTHROMBIN TIME: CPT

## 2022-11-06 PROCEDURE — 82728 ASSAY OF FERRITIN: CPT

## 2022-11-06 PROCEDURE — 2580000003 HC RX 258: Performed by: HOSPITALIST

## 2022-11-06 PROCEDURE — 83540 ASSAY OF IRON: CPT

## 2022-11-06 PROCEDURE — APPSS45 APP SPLIT SHARED TIME 31-45 MINUTES: Performed by: NURSE PRACTITIONER

## 2022-11-06 PROCEDURE — 94761 N-INVAS EAR/PLS OXIMETRY MLT: CPT

## 2022-11-06 PROCEDURE — 1200000000 HC SEMI PRIVATE

## 2022-11-06 PROCEDURE — 86140 C-REACTIVE PROTEIN: CPT

## 2022-11-06 PROCEDURE — 80053 COMPREHEN METABOLIC PANEL: CPT

## 2022-11-06 RX ORDER — LISINOPRIL 5 MG/1
5 TABLET ORAL DAILY
Status: DISCONTINUED | OUTPATIENT
Start: 2022-11-07 | End: 2022-11-09 | Stop reason: HOSPADM

## 2022-11-06 RX ADMIN — AMLODIPINE BESYLATE 5 MG: 5 TABLET ORAL at 10:09

## 2022-11-06 RX ADMIN — ENOXAPARIN SODIUM 30 MG: 100 INJECTION SUBCUTANEOUS at 10:09

## 2022-11-06 RX ADMIN — METOPROLOL TARTRATE 12.5 MG: 25 TABLET, FILM COATED ORAL at 21:20

## 2022-11-06 RX ADMIN — ENOXAPARIN SODIUM 30 MG: 100 INJECTION SUBCUTANEOUS at 21:21

## 2022-11-06 RX ADMIN — FAMOTIDINE 20 MG: 20 TABLET ORAL at 10:09

## 2022-11-06 RX ADMIN — FAMOTIDINE 20 MG: 20 TABLET ORAL at 21:20

## 2022-11-06 RX ADMIN — CEFTRIAXONE SODIUM 1000 MG: 1 INJECTION, POWDER, FOR SOLUTION INTRAMUSCULAR; INTRAVENOUS at 10:07

## 2022-11-06 RX ADMIN — ASPIRIN 81 MG CHEWABLE TABLET 81 MG: 81 TABLET CHEWABLE at 10:09

## 2022-11-06 RX ADMIN — ATORVASTATIN CALCIUM 40 MG: 40 TABLET, FILM COATED ORAL at 21:20

## 2022-11-06 RX ADMIN — SODIUM CHLORIDE 25 ML: 9 INJECTION, SOLUTION INTRAVENOUS at 10:05

## 2022-11-06 RX ADMIN — CLOPIDOGREL BISULFATE 75 MG: 75 TABLET ORAL at 10:09

## 2022-11-06 RX ADMIN — SODIUM CHLORIDE, PRESERVATIVE FREE 10 ML: 5 INJECTION INTRAVENOUS at 21:20

## 2022-11-06 NOTE — PROGRESS NOTES
Cardiology Progress Note     Today's Plan: stress and echo in the morning    Admit Date:  11/4/2022    Consult reason/ Seen today for: CAD/ CHF    Subjective and  Overnight Events:  patient is wanting to go home tomorrow. He is uncomfortable and having diarrhea and wants to be home. Chest pain no  Shortness of breath no  Palpitations no  Dizziness no  Swelling no      Assessment and Plan:  CAD: stress and echo in morning. Please keel NPO after MN. Continue ASA Plavix, metoprolol and lipitor  HTN: Stable        Telemetry Reviewed:   Sinus rhythm    ECHO :   echocardiogram      History of Presenting Illness:    Chief complain on admission : 71 y. o.year old who is admitted for  Chief Complaint   Patient presents with    Fatigue    Dehydration        Past medical history:    has a past medical history of Arthritis, Hypertension, and MI, old. Past surgical history:   has a past surgical history that includes Cardiac catheterization and Testicle surgery. Social History:   reports that he has been smoking cigarettes. He has never used smokeless tobacco. He reports current alcohol use. He reports that he does not use drugs. Family history:  family history is not on file. No Known Allergies    Review of Systems   All 14 systems were reviewed and are negative  Except for the positive findings  which as documented     BP 80/60   Pulse 86   Temp 98.6 °F (37 °C) (Oral)   Resp 20   Ht 6' 3\" (1.905 m)   Wt 290 lb 2 oz (131.6 kg)   SpO2 91%   BMI 36.26 kg/m²     Intake/Output Summary (Last 24 hours) at 11/6/2022 1223  Last data filed at 11/6/2022 1000  Gross per 24 hour   Intake 60 ml   Output 600 ml   Net -540 ml       Physical Exam  Vitals reviewed. Constitutional:       General: He is not in acute distress. Appearance: Normal appearance. He is obese. He is not ill-appearing. HENT:      Head: Atraumatic. Neck:      Vascular: No carotid bruit.    Cardiovascular:      Rate and Rhythm: Normal rate and regular rhythm. Pulses: Normal pulses. Heart sounds: Normal heart sounds. No murmur heard. Pulmonary:      Effort: Pulmonary effort is normal. No respiratory distress. Breath sounds: Normal breath sounds. Musculoskeletal:         General: No swelling or deformity. Cervical back: Neck supple. No muscular tenderness. Neurological:      Mental Status: He is alert. Medications:    [START ON 11/7/2022] lisinopril  5 mg Oral Daily    sodium chloride flush  5-40 mL IntraVENous 2 times per day    enoxaparin  30 mg SubCUTAneous BID    famotidine  20 mg Oral BID    cefTRIAXone (ROCEPHIN) IV  1,000 mg IntraVENous Q24H    atorvastatin  40 mg Oral Nightly    amLODIPine  5 mg Oral Daily    aspirin  81 mg Oral Daily    clopidogrel  75 mg Oral Daily    metoprolol tartrate  12.5 mg Oral BID      sodium chloride 25 mL (11/06/22 1005)     sodium chloride flush, sodium chloride, ondansetron **OR** ondansetron, polyethylene glycol, acetaminophen **OR** acetaminophen, potassium chloride **OR** potassium alternative oral replacement **OR** potassium chloride, magnesium sulfate    Lab Data:  CBC:   Recent Labs     11/04/22 1656 11/05/22  0855 11/06/22  0535   WBC 14.6* 15.6* 10.9*   HGB 8.9* 8.5* 8.1*   HCT 27.8* 26.6* 25.6*   MCV 98.2 98.5 98.8   * 408 384     BMP:   Recent Labs     11/04/22 1656 11/05/22  0855 11/06/22  0535   * 132* 134*   K 4.2 4.1 3.6   CL 95* 98* 103   CO2 21 20* 20*   BUN 9 8 9   CREATININE 1.2 1.2 1.0     PT/INR:   Recent Labs     11/06/22  0535   PROTIME 16.4*   INR 1.27     BNP:    Recent Labs     11/04/22 1656   PROBNP 863.5*     TROPONIN:   Recent Labs     11/04/22 1656 11/06/22  0535   TROPONINT <0.010 <0.010               All labs, medications and tests reviewed by myself , continue all other medications of all above medical condition listed as is except for changes mentioned above.     Thank you very much for consult , please call with questions. Electronically signed by LAURYN Sarah CNP on 11/6/2022 at 12:23 PM          CARDIOLOGY ATTENDING ADDENDUM    I have seen, spoken to and examined this patient personally, independent of the NP/PAC. I have reviewed the hospital care given to date and reviewed all pertinent labs and imaging. I have spoken with patient, nursing staff and provided written and verbal instructions . The above note has been reviewed. I have spent substantive amount of time in formulating patient care. Physical Exam:    General:   Awake, alert  Head:normal  Eye:normal  Chest:   Clear to auscultation 0 Basilar crackles   Cardiovascular:  S1S2   Abdomen: soft   Extremities:   0 edema  Pulses; palpable      MEDICAL DECISION MAKING :       History of non-STEMI/CAD/  s/p PCI, left circumflex, 2015, Dr. Michael Davison [place], [facility]  Essential hypertension  Hyperlipidemia  Elevated proBNP however patient clinically dehydrated and currently on IV fluids.   Continue     Patient denies any active chest pain  Complains of shortness of breath with exertion  Continue with aspirin 81 mg daily  Continue with Plavix 25 mg daily  Continue with metoprolol tartrate 12.5 mg twice daily  Continue lisinopril 10 mg p.o. daily  Continue with Lipitor 40 mg daily  Continue with Norvasc 5 mg p.o. daily     Stress MPI/echocardiogram Monday     Marily Loveless nephritis/urinary tract infection: IV antibiotics  Hypomagnesemia: Replete magnesium  Anemia/diarrhea: GI follow-up IV fluids  Tobacco abuse: Counseled against smoking  Morbid obesity: Advised low-fat diet and exercise as tolerated         Dr. Trista Gilmore MD

## 2022-11-06 NOTE — PROGRESS NOTES
Hospitalist Progress Note      Name:  Landon Batres /Age/Sex: 1950  (71 y.o. male)   MRN & CSN:  [id number] & [id number] Admission Date/Time: 2022  4:44 PM   Location:  [address] [place] [address] PCP: Lauren Jiang MD         Hospital Day: 3    Assessment and Plan:   Landon Batres is a 71 y.o.  male  who presents with Debility      Pyelonephritis- Fever. Leucocytosis trending down. Dirty urine. Culture E coli. Sensitivity pending. LA - negative. Blood culture  staph epidermis  likely contamination. continue Ceftriaxone. Diarrhea - Improving. Mostly watery. Stool work up negative (GI PCR/ c diff) improving. No recent Abx use. GI following    Dehydration - Improving. Generalized fatigue. S/p IV hydration    Elevated BNP - Reports history of 2 stents. Unaware of HF but takes lasix for dependent edema. Chest xray - vascular congestion. Currently saturating well in RA. ECHO. Hold off on lasix. Cardiology consulted. Hypovolemic Hyponatremia. Sodium 128. Likely from poor oral intake. Patient is getting IV NS. Will repeat q12 for now. Hypomagnesemia Magnesium 1.7. Replete and recheck    Anemia: Hemoglobin 8.9 on admission. No active bleeding reported. Hemoglobin was 13.one year back. Iron indices ordered as well. GI consulted. EGD / Colonoscopy as OP. CT abdomen - S/O pyelonephritis. FOBT negative. Iron panel no s/o VALENTIN. H/H currently stable    CAD s/p PCI of the left circumflex coronary artery in  - stable. No chest pain. Trop negative. Stress test in AM. Cardiology following    HTN - continue home meds    Diet ADULT DIET;  Regular  Diet NPO Exceptions are: Sips of Water with Meds   DVT Prophylaxis [] Lovenox, []  Heparin, [] SCDs, [] Ambulation   GI Prophylaxis [] PPI,  [] H2 Blocker,  [] Carafate,  [] Diet/Tube Feeds   Code Status Full Code   Disposition Patient requires continued admission due to ongoing diarrhea    MDM [] Low, [] Moderate,[]  High  Patient's risk as above due to multiple comorb     History of Present Illness:     Chief Complaint: Vikki Davenport is a 71 y.o.  male  who presents with PMH NSTEMI, CAD s/p PCI of the left circumflex coronary artery in 2015, hypertension, dependent peripheral edema, and tobacco use     Patient states that for the past week or so he has been feeling very weak. Denies any specific complaints like chest pain or abdominal pain. Denies any fever chills or rigors. Denies any nausea vomiting. Does concede to having diarrhea in the past few weeks and was given Lomotil by the family doctor which provided him some relief. Denies any hemoptysis hematemesis melena hematochezia. In the ER his serum chemistries revealed sodium of 128 potassium 4.2 chloride 95 bicarb 21 blood 09 creatinine 1.2 anion gap 12 magnesium 1.7 random glucose 119 serumcalcium is 9, total protein 7.3. proBNP 863. Troponin T less than 0.010. Liver function profile shows albumin 3.2 alkaline phosphatase at 10 ALT 31 AST 26 bilirubin 10 lipase 19 total protein 7.3. Hematology consult WBC 14.6 hemoglobin 8.9 hematocrit 27.8 and platelet count of 577. X-ray chest without any acute process. Ten point ROS reviewed negative, unless as noted above    Objective: Intake/Output Summary (Last 24 hours) at 11/6/2022 1436  Last data filed at 11/6/2022 1000  Gross per 24 hour   Intake 60 ml   Output 600 ml   Net -540 ml      Vitals:   Vitals:    11/06/22 1430   BP: 108/65   Pulse: 74   Resp: 18   Temp: 98.2 °F (36.8 °C)   SpO2: 96%     Physical Exam:   GEN Awake male, sitting upright in bed in no apparent distress. Appears given age. EYES Pupils are equally round. No scleral erythema, discharge, or conjunctivitis. HENT Mucous membranes are moist. Oral pharynx without exudates, no evidence of thrush. NECK Supple, no apparent thyromegaly or masses. RESP Clear to auscultation, no wheezes, rales or rhonchi. Symmetric chest movement while on room air. CARDIO/VASC S1/S2 auscultated. Regular rate without appreciable murmurs, rubs, or gallops. No JVD or carotid bruits. Peripheral pulses equal bilaterally and palpable. No peripheral edema. GI Abdomen is soft. Mild tenderness.  No costovertebral angle tenderness. Normal appearing external genitalia. Conte catheter is not present. HEME/LYMPH No palpable cervical lymphadenopathy and no hepatosplenomegaly. No petechiae or ecchymoses. MSK No gross joint deformities. SKIN Normal coloration, warm, dry. NEURO Cranial nerves appear grossly intact, normal speech, no lateralizing weakness. PSYCH Awake, alert, oriented x 4. Affect appropriate. Medications:   Medications:    [START ON 11/7/2022] lisinopril  5 mg Oral Daily    sodium chloride flush  5-40 mL IntraVENous 2 times per day    enoxaparin  30 mg SubCUTAneous BID    famotidine  20 mg Oral BID    cefTRIAXone (ROCEPHIN) IV  1,000 mg IntraVENous Q24H    atorvastatin  40 mg Oral Nightly    amLODIPine  5 mg Oral Daily    aspirin  81 mg Oral Daily    clopidogrel  75 mg Oral Daily    metoprolol tartrate  12.5 mg Oral BID      Infusions:    sodium chloride 25 mL (11/06/22 1005)     PRN Meds: sodium chloride flush, 5-40 mL, PRN  sodium chloride, , PRN  ondansetron, 4 mg, Q8H PRN   Or  ondansetron, 4 mg, Q6H PRN  polyethylene glycol, 17 g, Daily PRN  acetaminophen, 650 mg, Q6H PRN   Or  acetaminophen, 650 mg, Q6H PRN  potassium chloride, 40 mEq, PRN   Or  potassium alternative oral replacement, 40 mEq, PRN   Or  potassium chloride, 10 mEq, PRN  magnesium sulfate, 2,000 mg, PRN        Patient is still admitted because intractable diarrhea . The anticipated discharge is in less than 24 hours.      Electronically signed by Juventino Babinski, MD on 11/6/2022 at 2:36 PM

## 2022-11-06 NOTE — PROGRESS NOTES
DOING WELL NO ABD COMPLAINTS DIARRHEA RESOLVED HAD BM TODAY WITH NO GROSS BLOOD  VITALS STABLE   LABS NOTED HB 8.1 CT ABD REPORT NOTED STOOL STUDIES NEGATIVE  FOR STRESS TEST IN AM WILL CPM F/U LABS   PT WILL NEED EGD / COLONOSCOPY AS OUTPT [facility] PT AND RN Jenny Packer

## 2022-11-07 LAB
ALBUMIN SERPL-MCNC: 2.7 GM/DL (ref 3.4–5)
ALP BLD-CCNC: 100 IU/L (ref 40–129)
ALT SERPL-CCNC: 35 U/L (ref 10–40)
ANION GAP SERPL CALCULATED.3IONS-SCNC: 10 MMOL/L (ref 4–16)
AST SERPL-CCNC: 49 IU/L (ref 15–37)
BASOPHILS ABSOLUTE: 0 K/CU MM
BASOPHILS RELATIVE PERCENT: 0.2 % (ref 0–1)
BILIRUB SERPL-MCNC: 1 MG/DL (ref 0–1)
BUN BLDV-MCNC: 8 MG/DL (ref 6–23)
CALCIUM SERPL-MCNC: 8.2 MG/DL (ref 8.3–10.6)
CHLORIDE BLD-SCNC: 103 MMOL/L (ref 99–110)
CO2: 21 MMOL/L (ref 21–32)
CREAT SERPL-MCNC: 0.9 MG/DL (ref 0.9–1.3)
CULTURE: ABNORMAL
CULTURE: ABNORMAL
DIFFERENTIAL TYPE: ABNORMAL
EOSINOPHILS ABSOLUTE: 0.2 K/CU MM
EOSINOPHILS RELATIVE PERCENT: 1.6 % (ref 0–3)
FOLATE: 16.8 NG/ML (ref 3.1–17.5)
GFR SERPL CREATININE-BSD FRML MDRD: >60 ML/MIN/1.73M2
GLUCOSE BLD-MCNC: 95 MG/DL (ref 70–99)
HCT VFR BLD CALC: 24.9 % (ref 42–52)
HEMOGLOBIN: 7.9 GM/DL (ref 13.5–18)
IMMATURE NEUTROPHIL %: 0.4 % (ref 0–0.43)
LYMPHOCYTES ABSOLUTE: 1.2 K/CU MM
LYMPHOCYTES RELATIVE PERCENT: 12.2 % (ref 24–44)
Lab: ABNORMAL
MAGNESIUM: 1.8 MG/DL (ref 1.8–2.4)
MCH RBC QN AUTO: 31.2 PG (ref 27–31)
MCHC RBC AUTO-ENTMCNC: 31.7 % (ref 32–36)
MCV RBC AUTO: 98.4 FL (ref 78–100)
MONOCYTES ABSOLUTE: 0.8 K/CU MM
MONOCYTES RELATIVE PERCENT: 7.7 % (ref 0–4)
NUCLEATED RBC %: 0 %
PDW BLD-RTO: 14.7 % (ref 11.7–14.9)
PLATELET # BLD: 386 K/CU MM (ref 140–440)
PMV BLD AUTO: 9.3 FL (ref 7.5–11.1)
POTASSIUM SERPL-SCNC: 4 MMOL/L (ref 3.5–5.1)
RBC # BLD: 2.53 M/CU MM (ref 4.6–6.2)
SEGMENTED NEUTROPHILS ABSOLUTE COUNT: 7.8 K/CU MM
SEGMENTED NEUTROPHILS RELATIVE PERCENT: 77.9 % (ref 36–66)
SODIUM BLD-SCNC: 134 MMOL/L (ref 135–145)
SPECIMEN: ABNORMAL
TOTAL IMMATURE NEUTOROPHIL: 0.04 K/CU MM
TOTAL NUCLEATED RBC: 0 K/CU MM
TOTAL PROTEIN: 5.8 GM/DL (ref 6.4–8.2)
VITAMIN B-12: >2000 PG/ML (ref 211–911)
WBC # BLD: 10.1 K/CU MM (ref 4–10.5)

## 2022-11-07 PROCEDURE — 97530 THERAPEUTIC ACTIVITIES: CPT

## 2022-11-07 PROCEDURE — 36415 COLL VENOUS BLD VENIPUNCTURE: CPT

## 2022-11-07 PROCEDURE — 85025 COMPLETE CBC W/AUTO DIFF WBC: CPT

## 2022-11-07 PROCEDURE — 2580000003 HC RX 258: Performed by: STUDENT IN AN ORGANIZED HEALTH CARE EDUCATION/TRAINING PROGRAM

## 2022-11-07 PROCEDURE — 80053 COMPREHEN METABOLIC PANEL: CPT

## 2022-11-07 PROCEDURE — 1200000000 HC SEMI PRIVATE

## 2022-11-07 PROCEDURE — 6370000000 HC RX 637 (ALT 250 FOR IP): Performed by: HOSPITALIST

## 2022-11-07 PROCEDURE — 6360000002 HC RX W HCPCS: Performed by: STUDENT IN AN ORGANIZED HEALTH CARE EDUCATION/TRAINING PROGRAM

## 2022-11-07 PROCEDURE — 97116 GAIT TRAINING THERAPY: CPT

## 2022-11-07 PROCEDURE — 6370000000 HC RX 637 (ALT 250 FOR IP): Performed by: NURSE PRACTITIONER

## 2022-11-07 PROCEDURE — 83735 ASSAY OF MAGNESIUM: CPT

## 2022-11-07 PROCEDURE — APPSS45 APP SPLIT SHARED TIME 31-45 MINUTES: Performed by: NURSE PRACTITIONER

## 2022-11-07 PROCEDURE — 97166 OT EVAL MOD COMPLEX 45 MIN: CPT

## 2022-11-07 PROCEDURE — 6360000002 HC RX W HCPCS: Performed by: HOSPITALIST

## 2022-11-07 PROCEDURE — 99233 SBSQ HOSP IP/OBS HIGH 50: CPT | Performed by: INTERNAL MEDICINE

## 2022-11-07 PROCEDURE — 97162 PT EVAL MOD COMPLEX 30 MIN: CPT

## 2022-11-07 PROCEDURE — 6370000000 HC RX 637 (ALT 250 FOR IP): Performed by: STUDENT IN AN ORGANIZED HEALTH CARE EDUCATION/TRAINING PROGRAM

## 2022-11-07 PROCEDURE — 94761 N-INVAS EAR/PLS OXIMETRY MLT: CPT

## 2022-11-07 PROCEDURE — 2580000003 HC RX 258: Performed by: HOSPITALIST

## 2022-11-07 RX ADMIN — METOPROLOL TARTRATE 12.5 MG: 25 TABLET, FILM COATED ORAL at 08:39

## 2022-11-07 RX ADMIN — CEFTRIAXONE SODIUM 1000 MG: 1 INJECTION, POWDER, FOR SOLUTION INTRAMUSCULAR; INTRAVENOUS at 08:37

## 2022-11-07 RX ADMIN — METOPROLOL TARTRATE 12.5 MG: 25 TABLET, FILM COATED ORAL at 21:59

## 2022-11-07 RX ADMIN — SODIUM CHLORIDE, PRESERVATIVE FREE 10 ML: 5 INJECTION INTRAVENOUS at 08:02

## 2022-11-07 RX ADMIN — ENOXAPARIN SODIUM 30 MG: 100 INJECTION SUBCUTANEOUS at 08:43

## 2022-11-07 RX ADMIN — ASPIRIN 81 MG CHEWABLE TABLET 81 MG: 81 TABLET CHEWABLE at 08:39

## 2022-11-07 RX ADMIN — FAMOTIDINE 20 MG: 20 TABLET ORAL at 09:12

## 2022-11-07 RX ADMIN — ENOXAPARIN SODIUM 30 MG: 100 INJECTION SUBCUTANEOUS at 21:59

## 2022-11-07 RX ADMIN — SODIUM CHLORIDE, PRESERVATIVE FREE 10 ML: 5 INJECTION INTRAVENOUS at 21:59

## 2022-11-07 RX ADMIN — CLOPIDOGREL BISULFATE 75 MG: 75 TABLET ORAL at 08:39

## 2022-11-07 RX ADMIN — FAMOTIDINE 20 MG: 20 TABLET ORAL at 21:59

## 2022-11-07 RX ADMIN — LISINOPRIL 5 MG: 5 TABLET ORAL at 08:39

## 2022-11-07 RX ADMIN — AMLODIPINE BESYLATE 5 MG: 5 TABLET ORAL at 08:39

## 2022-11-07 RX ADMIN — ATORVASTATIN CALCIUM 40 MG: 40 TABLET, FILM COATED ORAL at 21:59

## 2022-11-07 NOTE — PROGRESS NOTES
Cardiology Progress Note     Today's Plan: stress in the morning    Admit Date:  11/4/2022    Consult reason/ Seen today for: CAD/ CHF    Subjective and  Overnight Events:  Patient states that he is feeling better today. Chest pain no  Shortness of breath no  Palpitations no  Dizziness no  Swelling no      Assessment and Plan:  CAD: stress in morning. Please keel NPO after MN. Continue ASA Plavix, metoprolol and lipitor  HTN: Stable        Telemetry Reviewed:   Sinus rhythm    ECHO :   echocardiogram      History of Presenting Illness:    Chief complain on admission : 71 y. o.year old who is admitted for  Chief Complaint   Patient presents with    Fatigue    Dehydration        Past medical history:    has a past medical history of Arthritis, Hypertension, and MI, old. Past surgical history:   has a past surgical history that includes Cardiac catheterization and Testicle surgery. Social History:   reports that he has been smoking cigarettes. He has never used smokeless tobacco. He reports current alcohol use. He reports that he does not use drugs. Family history:  family history is not on file. No Known Allergies    Review of Systems   All 14 systems were reviewed and are negative  Except for the positive findings  which as documented     /62   Pulse 86   Temp 98.4 °F (36.9 °C) (Oral)   Resp 16   Ht 6' 3\" (1.905 m)   Wt 220 lb 3.8 oz (99.9 kg)   SpO2 98%   BMI 27.53 kg/m²     Intake/Output Summary (Last 24 hours) at 11/7/2022 1725  Last data filed at 11/7/2022 0342  Gross per 24 hour   Intake --   Output 500 ml   Net -500 ml         Physical Exam  Vitals reviewed. Constitutional:       General: He is not in acute distress. Appearance: Normal appearance. He is obese. He is not ill-appearing. HENT:      Head: Atraumatic. Neck:      Vascular: No carotid bruit. Cardiovascular:      Rate and Rhythm: Normal rate and regular rhythm. Pulses: Normal pulses.       Heart sounds: Normal heart sounds. No murmur heard. Pulmonary:      Effort: Pulmonary effort is normal. No respiratory distress. Breath sounds: Normal breath sounds. Musculoskeletal:         General: No swelling or deformity. Cervical back: Neck supple. No muscular tenderness. Neurological:      Mental Status: He is alert. Medications:    lisinopril  5 mg Oral Daily    sodium chloride flush  5-40 mL IntraVENous 2 times per day    enoxaparin  30 mg SubCUTAneous BID    famotidine  20 mg Oral BID    cefTRIAXone (ROCEPHIN) IV  1,000 mg IntraVENous Q24H    atorvastatin  40 mg Oral Nightly    amLODIPine  5 mg Oral Daily    aspirin  81 mg Oral Daily    clopidogrel  75 mg Oral Daily    metoprolol tartrate  12.5 mg Oral BID      sodium chloride 25 mL (11/06/22 1005)     sodium chloride flush, sodium chloride, ondansetron **OR** ondansetron, polyethylene glycol, potassium chloride **OR** potassium alternative oral replacement **OR** potassium chloride, magnesium sulfate    Lab Data:  CBC:   Recent Labs     11/05/22 0855 11/06/22 0535 11/07/22 0428   WBC 15.6* 10.9* 10.1   HGB 8.5* 8.1* 7.9*   HCT 26.6* 25.6* 24.9*   MCV 98.5 98.8 98.4    384 386       BMP:   Recent Labs     11/05/22 0855 11/06/22 0535 11/07/22 0428   * 134* 134*   K 4.1 3.6 4.0   CL 98* 103 103   CO2 20* 20* 21   BUN 8 9 8   CREATININE 1.2 1.0 0.9       PT/INR:   Recent Labs     11/06/22 0535   PROTIME 16.4*   INR 1.27       BNP:    No results for input(s): PROBNP in the last 72 hours. TROPONIN:   Recent Labs     11/06/22 0535   TROPONINT <0.010          All labs, medications and tests reviewed by myself , continue all other medications of all above medical condition listed as is except for changes mentioned above. Thank you very much for consult , please call with questions.     Electronically signed by LAURYN Gonzalez CNP on 11/7/2022 at 5:25 PM          [facility]    I have seen, spoken to and examined this patient personally, independent of the NP/PAC. I have reviewed the hospital care given to date and reviewed all pertinent labs and imaging. I have spoken with patient, nursing staff and provided written and verbal instructions . The above note has been reviewed. I have spent substantive amount of time in formulating patient care. Physical Exam:    General:   Awake, alert  Head:normal  Eye:normal  Chest:   Clear to auscultation 0 Basilar crackles          MEDICAL DECISION MAKING :          History of non-STEMI/CAD/  s/p PCI, left circumflex, 2015, Dr. Neo Davison, [facility]  Essential hypertension  Hyperlipidemia  Elevated proBNP however patient clinically dehydrated and currently on IV fluids. Continue     Patient denies any active chest pain  Complains of shortness of breath with exertion  Continue with aspirin 81 mg daily  Continue with Plavix 25 mg daily  Continue with metoprolol tartrate 12.5 mg twice daily  Continue lisinopril 10 mg p.o. daily  Continue with Lipitor 40 mg daily  Continue with Norvasc 5 mg p.o. daily     Stress MPI/echocardiogram  tomorrow  stress test could not be performed today due to anemia.   If hemoglobin remains less than 8, will recommend outpatient stress test tomorrow morning     [place] nephritis/urinary tract infection: IV antibiotics  Hypomagnesemia: Replete magnesium  Anemia/diarrhea: GI follow-up IV fluids  Tobacco abuse: Counseled against smoking  Morbid obesity: Advised low-fat diet and exercise as tolerated             Dr. Eyal Chacon MD

## 2022-11-07 NOTE — PROGRESS NOTES
Hospitalist Progress Note      Name:  Stuart Draper /Age/Sex: 1950  (71 y.o. male)   MRN & CSN:  [id number] & [id number] Admission Date/Time: 2022  4:44 PM   Location:  [address] [place] [address] PCP: Manda Omalley MD         Hospital Day: 4    Assessment and Plan:   Stuart Draper is a 71 y.o.  male  who presents with Debility      Pyelonephritis- Fever. Leucocytosis trending down. Dirty urine. Culture E coli. Sensitive to Cefazolin. LA - negative. Blood culture  staph epidermis  likely contamination. But patient spiked fever this morning. ID consulted for feedback. continue Ceftriaxone for now. Diarrhea - Resolved. Mostly watery. Stool work up negative (GI PCR/ c diff) . No recent Abx use. GI following    3. Dehydration - Improving. Generalized fatigue. S/p IV hydration    4. CAD s/p PCI of the left circumflex coronary artery in  - stable. No chest pain. Trop negative. Cardiology following. Patient needs Stress test .  Stress test was held due hb <8. I discussed with Dr. Marily Stubbs if we need to transfuse. Advised just monitor for now and he will re-evaluate him tomorrow if stress test can be done. 5.  Elevated BNP - Reports history of 2 stents. Unaware of HF but takes lasix for dependent edema. Chest xray - vascular congestion. Currently saturating well in RA. ECHO. Hold off on lasix. Cardiology consulted. 6.  Hypovolemic Hyponatremia. Resolving. Likely from poor oral intake. S/p IV NS. Oral hydration. 7.  Hypomagnesemia - corrected. 8.  Anemia: Hemoglobin 8.9 on admission. No active bleeding reported. Hemoglobin was 13.one year back. Iron indices ordered as well. GI consulted. EGD / Colonoscopy as OP. CT abdomen - S/O pyelonephritis. FOBT negative. Iron panel no s/o VALENTIN. H/H currently stable      9.   HTN - continue home meds    Diet No diet orders on file   DVT Prophylaxis [] Lovenox, []  Heparin, [] SCDs, [] Ambulation   GI Prophylaxis [] PPI,  [] H2 Blocker,  [] Carafate,  [] Diet/Tube Feeds   Code Status Full Code   Disposition Patient requires continued admission due to ongoing diarrhea    MDM [] Low, [] Moderate,[]  High  Patient's risk as above due to multiple comorb     History of Present Illness:     Chief Complaint: George Gentile is a 71 y.o.  male  who presents with PMH NSTEMI, CAD s/p PCI of the left circumflex coronary artery in 2015, hypertension, dependent peripheral edema, and tobacco presented with excessive fatigue    Patient had one recorded fever but he himself does not feel feverish. Denies any chills, shivering and reports overall improvement in his energy level. Diarrhea has resolved     Ten point ROS reviewed negative, unless as noted above    Objective: Intake/Output Summary (Last 24 hours) at 11/7/2022 1248  Last data filed at 11/7/2022 0342  Gross per 24 hour   Intake --   Output 500 ml   Net -500 ml      Vitals:   Vitals:    11/07/22 0745   BP: (!) 118/52   Pulse: 82   Resp: 16   Temp: (!) 100.5 °F (38.1 °C)   SpO2: 100%     Physical Exam:   GEN Awake male, sitting upright in bed in no apparent distress. Appears given age. EYES Pupils are equally round. No scleral erythema, discharge, or conjunctivitis. HENT Mucous membranes are moist. Oral pharynx without exudates, no evidence of thrush. NECK Supple, no apparent thyromegaly or masses. RESP Clear to auscultation, no wheezes, rales or rhonchi. Symmetric chest movement while on room air. CARDIO/VASC S1/S2 auscultated. Regular rate without appreciable murmurs, rubs, or gallops. No JVD or carotid bruits. Peripheral pulses equal bilaterally and palpable. No peripheral edema. GI Abdomen is soft. Mild tenderness.  No costovertebral angle tenderness. Normal appearing external genitalia. Conte catheter is not present. HEME/LYMPH No palpable cervical lymphadenopathy and no hepatosplenomegaly. No petechiae or ecchymoses. MSK No gross joint deformities.   SKIN Normal coloration, warm, dry.  NEURO Cranial nerves appear grossly intact, normal speech, no lateralizing weakness. PSYCH Awake, alert, oriented x 4. Affect appropriate. Medications:   Medications:    lisinopril  5 mg Oral Daily    sodium chloride flush  5-40 mL IntraVENous 2 times per day    enoxaparin  30 mg SubCUTAneous BID    famotidine  20 mg Oral BID    cefTRIAXone (ROCEPHIN) IV  1,000 mg IntraVENous Q24H    atorvastatin  40 mg Oral Nightly    amLODIPine  5 mg Oral Daily    aspirin  81 mg Oral Daily    clopidogrel  75 mg Oral Daily    metoprolol tartrate  12.5 mg Oral BID      Infusions:    sodium chloride 25 mL (11/06/22 1005)     PRN Meds: sodium chloride flush, 5-40 mL, PRN  sodium chloride, , PRN  ondansetron, 4 mg, Q8H PRN   Or  ondansetron, 4 mg, Q6H PRN  polyethylene glycol, 17 g, Daily PRN  potassium chloride, 40 mEq, PRN   Or  potassium alternative oral replacement, 40 mEq, PRN   Or  potassium chloride, 10 mEq, PRN  magnesium sulfate, 2,000 mg, PRN        Patient is still admitted because intractable diarrhea . The anticipated discharge is in less than 24 hours.      Electronically signed by Allie Mack MD on 11/7/2022 at 12:48 PM

## 2022-11-07 NOTE — PROGRESS NOTES
Physician Progress Note      PATIENT:               Mohinder Rodriguez  CSN #:                  [id number]  :                       1950  ADMIT DATE:       2022 4:44 PM  [address] [place] DATE:  Jeffstacy Donald  PROVIDER #:        Lakia Bustos MD          QUERY TEXT:    Pt admitted with debility. Pt noted to have pyelonephritis, leukocytosis,   fever, elevated procal and elevated crp. If possible, please document in the   progress notes and discharge summary if you are evaluating and /or treating   any of the following: The medical record reflects the following:  Risk Factors: pyelonephritis, diarrhea  Clinical Indicators:  progress note WBC 15.6, .5,  procal 19.24,    T- max 100.6, urine Cx ESCHERICHIA COLI >100,000 CFU/ml Multiple Resistant   Drug Organism. Treatment: 0.9% NS 1 l bolus, IV Rocephin, urine Cx, CBC. Thank you,  LATONIA RinconN, RN, CDS  [phone]  Options provided:  -- Sepsis, present on admission  -- Sepsis, present on admission, now resolved  -- Pyelonephritis without Sepsis  -- Other - I will add my own diagnosis  -- Disagree - Not applicable / Not valid  -- Disagree - Clinically unable to determine / Unknown  -- Refer to Clinical Documentation Reviewer    PROVIDER RESPONSE TEXT:    This patient has sepsis which was present on admission.     Query created by: Bert Fuentes on 2022 12:13 PM      Electronically signed by:  Lakia Bustos MD 2022 1:03 PM

## 2022-11-07 NOTE — CONSULTS
Darrell, 1950, 4107/4107-A, 11/7/2022    History  [place]:  The primary encounter diagnosis was Generalized weakness. Diagnoses of Dehydration, Hyponatremia, and Anemia, unspecified type were also pertinent to this visit. Patient  has a past medical history of Arthritis, Hypertension, and MI, old. Patient  has a past surgical history that includes Cardiac catheterization and Testicle surgery. Subjective:  Patient states:  \"I think I need to stay another day. \"    Pain:  denies pain. Communication with other providers:  Handoff to RN,  Restrictions: general precautions, fall risk    Home Setup/Prior level of function  Social/Functional History  Lives With: Alone  Type of Home: Apartment (apartment within the home, lives on second level)  Home Layout: Multi-level  Home Access: Stairs to enter with rails  Entrance Stairs - Number of Steps: full flight to access living area  Bathroom Shower/Tub: Tub/Shower unit  Bathroom Toilet: Standard  Home Equipment: Cane (mod I with SPC)  ADL Assistance: Independent  Homemaking Assistance: Independent  Homemaking Responsibilities: Yes  Ambulation Assistance: Independent  Transfer Assistance: Independent  Active : No  Patient's  Info: rides plus  Occupation: Retired    Examination of body systems (includes body structures/functions, activity/participation limitations):  Observation:  Pt supine in bed upon arrival and agreeable to therapy  Vision:  WFL  Hearing:  WFL  Cardiopulmonary:  no O2 needs  Cognition: WFL, see OT/SLP note for further evaluation. Musculoskeletal  ROM R/L:  WFL. Strength R/L:  4/5, moderate impairment in function and endurance. Neuro:  WFL      Mobility:  Rolling L/R:  SBA  Supine to sit:  SBA  Transfers: pt completed STS from EOB, to/from commode, and to chair CGA with cues for sequencing  Sitting balance:  good.     Standing balance:  fair-.    Gait: pt ambulated 10' + 10' CGA with SPC and other hand furniture walking. Pt ambulated with decreased rosanne and forward flexed posture. Cues provided for upright posture. Education: pt educated on use of RW and RW fitted to pt and left in room    [facility] 6 Clicks Inpatient Mobility:  AM-PAC Inpatient Mobility Raw Score : 17    Safety: patient left in chair with alarm on, call light within reach, RN notified, gait belt used. Assessment:  Pt is a 71 y.o. male admitted to the hospital for debility. Pt is typically mod I with SPC. Pt is currently performing bed mobility SBA, transferring CGA, and ambulating 10' CGA with SPC and furniture walking. Pt is presenting with decreased endurance, impaired balance, impaired gait, impaired transfers, and decreased strength. Pt would benefit from continued acute care PT as well as ARU placement upon discharge to continue to address impairments. Complexity: moderate    Prognosis: Good, no significant barriers to participation at this time.      General Plan: 3-5 times per week/week     Equipment: none; if pt discharges home, recommend bariatric RW    Goals:  Short Term Goals  Time Frame for Short Term Goals: 1 week  Short Term Goal 1: Pt to complete all bed mobility mod I  Short Term Goal 2: Pt to complete all STS transfers to/from bed, commode, and chair mod I  Short Term Goal 3: Pt to ambulate 48' with LRAD CGA  Short Term Goal 4: Pt to ascend/descend full flight of stairs with HR CGA to simulate home set up       Treatment plan:  Bed mobility, transfers, balance, gait, TA, TX    Recommendations for NURSING mobility: amb with gait belt and RW    Time:   Time in: 1026  Time out: 1044  Timed treatment minutes: 8  Total time: 18    Electronically signed by:    Mauricio Vega PT  11/7/2022, 11:42 AM

## 2022-11-07 NOTE — PROGRESS NOTES
Occupational Therapy    [facility] ACUTE CARE OCCUPATIONAL THERAPY EVALUATION  Crystal Lopez, 1950, 4107/4107-A, 11/7/2022    History  [place]:  The primary encounter diagnosis was Generalized weakness. Diagnoses of Dehydration, Hyponatremia, and Anemia, unspecified type were also pertinent to this visit. Patient  has a past medical history of Arthritis, Hypertension, and MI, old. Patient  has a past surgical history that includes Cardiac catheterization and Testicle surgery. Subjective:  Patient states:  \"I'm tired after walking with PT earlier\". Pain:  No.    Communication with other providers:  Handoff to RN  Restrictions: MDRO Contact Precautions, General Precautions, Fall Risk    Home Setup/Prior level of function  Social/Functional History  Lives With: Alone  Type of Home: Apartment (apartment within the home, lives on second level)  Home Layout: Multi-level  Home Access: Stairs to enter with rails  Entrance Stairs - Number of Steps: full flight to access living area  Bathroom Shower/Tub: Tub/Shower unit  Bathroom Toilet: Standard  Home Equipment: Cane (mod I with SPC)  ADL Assistance: Independent  Homemaking Assistance: Independent  Homemaking Responsibilities: Yes  Ambulation Assistance: Independent  Transfer Assistance: Independent  Active : No  Patient's  Info: rides plus  Occupation: Retired    Examination of body systems (includes body structures/functions, activity/participation limitations):  Observation:  Supine in bed upon arrival, agreeable to therapy   Vision:  Glasses  [email or web address]  Cardiopulmonary:  No 02 needs      Body Systems and functions:  ROM R/L:  WFL.     Strength R/L:  4/5,   Sensation: WFL  Tone: Normal  Coordination: WFL  Perception: WNL    Activities of Daily Living (ADLs):  Feeding: Sai (dentures)  Grooming: CGA (washing hands/face w/ warm washcloth)   UB bathing: Supervision  LB bathing: CGA  UB dressing: Supervision  LB dressing: CGA  Toileting: CGA    Cognitive and Psychosocial Functioning:  Overall cognitive status: WNL  Affect: Normal        Mobility:  Supine to sit:  Supervision  Transfers: CGA from EOB up to RW  Sitting balance:  Supervision. Standing balance:  CGA w/ RW. Functional Mobility CGA w/ RW ~20 feet before fatiguing  Toilet/Shower Transfers: DNT    Sit to supine: Supervision  Scooting to [facility]: Supervision           AM-PAC Daily Activity Inpatient   How much help for putting on and taking off regular lower body clothing?: A Little  How much help for Bathing?: A Little  How much help for Toileting?: A Little  How much help for putting on and taking off regular upper body clothing?: None  How much help for taking care of personal grooming?: A Little  How much help for eating meals?: None  AM-[facility] Inpatient Daily Activity Raw Score: 20  AM-PAC Inpatient ADL T-Scale Score : 42.03  ADL Inpatient CMS 0-100% Score: 38.32  ADL Inpatient CMS G-Code Modifier : CJ    Treatment:  Therapeutic Activity Training:   Therapeutic activity training was instructed today. Cues were given for safety, sequence, UE/LE placement, awareness, and balance. Activities performed today included bed mobility training, sup-sit, sit-stand, functional mobility, stand to sit, sit to supine, scooting to [facility]    Safety: patient left in chair with chair alarm, call light within reach, RN notified, gait belt used. Assessment:  Pt is a 72 yo male admitted from home for debility. Pt at baseline is Independent for ADLs Independent for high level IADLs and Independent for functional transfers/mobility. Pt currently presents w/ deficits in ADL and high level IADL independence, functional activity tolerance, dynamic sitting and standing balance and tolerance and functional transfers and BUE strength. Pt would benefit from continued acute care OT services w/ discharge to ARU  Complexity: Moderate  Prognosis: Good, no significant barriers to participation at this time. Occupational Therapy Plan  Times Per Week: 4x+  Times Per Day:  Once a day  Current Treatment Recommendations: Strengthening, ROM, Balance training, Functional mobility training, Endurance training, Patient/Caregiver education & training, Pain management, Equipment evaluation, education, & procurement, Self-Care / ADL, Positioning, Home management training     Equipment: defer    Goals:  Pt goal: go home  Time Frame for STGs: discharge  Goal 1: Pt will perform UE ADLs Independent  Goal 2: Pt will perform LE ADLs Independent  Goal 3: Pt will perform toileting Independent  Goal 4: Pt will perform functional transfer w/ AD Sai  Goal 5: Pt will perform functional mobility w/ AD Sai  Goal 6: Pt will perform therex/theract in order to increase functional activity tolerance and dynamic standing balance    Treatment plan:  Pt will perform functional task in stand reaching in all 3 planes in order to increase dynamic standing balance and functional activity tolerance    Recommendations for NURSING activity: Up to chair for all 3 meals and up to standard commode for all toileting needs    Time:   Time in: 1418  Time out: 1431  Timed treatment minutes: 8 minutes  Total time: 13 minutes    Electronically signed by:    Sherie VILLA/RIA [id number]  2:57 PM,11/7/2022

## 2022-11-07 NOTE — CARE COORDINATION
Reviewed chart and spoke with pt about discharge needs/plans. Pt lives alone, PTA he was independent with ADL's , used a cane for mobility but would like a RW and Home care at discharge. Pt has a PCP and insurance that covers medications. Pt uses his insurance for transportation. Also place local transportation options in AVS.   CM will [facility]. and getting walker in am.      Kamryn Burleson was evaluated today and a DME order was entered for a standard walker because he requires this to successfully complete daily living tasks of eating, bathing, toileting, personal cares, ambulating, grooming, hygiene, dressing upper body, dressing lower body, meal preparation, and taking own medications. A standard walker is necessary due to the patient's impaired ambulation and mobility restrictions and he can ambulate only by using a walker instead of a lesser assistive device, such as a cane or crutch. The need for this equipment was discussed with the patient and he understands and is in agreement.

## 2022-11-08 PROBLEM — N12 PYELONEPHRITIS: Status: ACTIVE | Noted: 2022-11-08

## 2022-11-08 PROBLEM — R50.9 FEVER: Status: ACTIVE | Noted: 2022-11-08

## 2022-11-08 LAB
ALBUMIN SERPL-MCNC: 2.8 GM/DL (ref 3.4–5)
ALP BLD-CCNC: 100 IU/L (ref 40–129)
ALT SERPL-CCNC: 37 U/L (ref 10–40)
ANION GAP SERPL CALCULATED.3IONS-SCNC: 12 MMOL/L (ref 4–16)
AST SERPL-CCNC: 46 IU/L (ref 15–37)
BASOPHILS ABSOLUTE: 0 K/CU MM
BASOPHILS RELATIVE PERCENT: 0.3 % (ref 0–1)
BILIRUB SERPL-MCNC: 1 MG/DL (ref 0–1)
BUN BLDV-MCNC: 6 MG/DL (ref 6–23)
CALCIUM SERPL-MCNC: 8.4 MG/DL (ref 8.3–10.6)
CHLORIDE BLD-SCNC: 101 MMOL/L (ref 99–110)
CO2: 21 MMOL/L (ref 21–32)
CREAT SERPL-MCNC: 0.8 MG/DL (ref 0.9–1.3)
CULTURE: ABNORMAL
CULTURE: ABNORMAL
DIFFERENTIAL TYPE: ABNORMAL
EKG ATRIAL RATE: 106 BPM
EKG DIAGNOSIS: NORMAL
EKG P-R INTERVAL: 138 MS
EKG Q-T INTERVAL: 354 MS
EKG QRS DURATION: 78 MS
EKG QTC CALCULATION (BAZETT): 470 MS
EKG R AXIS: 170 DEGREES
EKG T AXIS: 179 DEGREES
EKG VENTRICULAR RATE: 106 BPM
EOSINOPHILS ABSOLUTE: 0.1 K/CU MM
EOSINOPHILS RELATIVE PERCENT: 0.9 % (ref 0–3)
GFR SERPL CREATININE-BSD FRML MDRD: >60 ML/MIN/1.73M2
GLUCOSE BLD-MCNC: 101 MG/DL (ref 70–99)
HCT VFR BLD CALC: 24.6 % (ref 42–52)
HEMOGLOBIN: 7.8 GM/DL (ref 13.5–18)
IMMATURE NEUTROPHIL %: 0.3 % (ref 0–0.43)
LV EF: 58 %
LVEF MODALITY: NORMAL
LYMPHOCYTES ABSOLUTE: 1.5 K/CU MM
LYMPHOCYTES RELATIVE PERCENT: 15.2 % (ref 24–44)
Lab: ABNORMAL
MCH RBC QN AUTO: 30.7 PG (ref 27–31)
MCHC RBC AUTO-ENTMCNC: 31.7 % (ref 32–36)
MCV RBC AUTO: 96.9 FL (ref 78–100)
MONOCYTES ABSOLUTE: 1 K/CU MM
MONOCYTES RELATIVE PERCENT: 10.2 % (ref 0–4)
NUCLEATED RBC %: 0 %
PDW BLD-RTO: 14.6 % (ref 11.7–14.9)
PLATELET # BLD: 372 K/CU MM (ref 140–440)
PMV BLD AUTO: 9.1 FL (ref 7.5–11.1)
POTASSIUM SERPL-SCNC: 4.1 MMOL/L (ref 3.5–5.1)
PROCALCITONIN: 6.22
RBC # BLD: 2.54 M/CU MM (ref 4.6–6.2)
SEGMENTED NEUTROPHILS ABSOLUTE COUNT: 7 K/CU MM
SEGMENTED NEUTROPHILS RELATIVE PERCENT: 73.1 % (ref 36–66)
SODIUM BLD-SCNC: 134 MMOL/L (ref 135–145)
SPECIMEN: ABNORMAL
TOTAL IMMATURE NEUTOROPHIL: 0.03 K/CU MM
TOTAL NUCLEATED RBC: 0 K/CU MM
TOTAL PROTEIN: 6 GM/DL (ref 6.4–8.2)
WBC # BLD: 9.6 K/CU MM (ref 4–10.5)

## 2022-11-08 PROCEDURE — 93306 TTE W/DOPPLER COMPLETE: CPT

## 2022-11-08 PROCEDURE — 97530 THERAPEUTIC ACTIVITIES: CPT

## 2022-11-08 PROCEDURE — 85025 COMPLETE CBC W/AUTO DIFF WBC: CPT

## 2022-11-08 PROCEDURE — 6360000002 HC RX W HCPCS: Performed by: HOSPITALIST

## 2022-11-08 PROCEDURE — 6370000000 HC RX 637 (ALT 250 FOR IP): Performed by: NURSE PRACTITIONER

## 2022-11-08 PROCEDURE — APPSS45 APP SPLIT SHARED TIME 31-45 MINUTES: Performed by: NURSE PRACTITIONER

## 2022-11-08 PROCEDURE — 2580000003 HC RX 258: Performed by: STUDENT IN AN ORGANIZED HEALTH CARE EDUCATION/TRAINING PROGRAM

## 2022-11-08 PROCEDURE — 93010 ELECTROCARDIOGRAM REPORT: CPT | Performed by: INTERNAL MEDICINE

## 2022-11-08 PROCEDURE — 2580000003 HC RX 258: Performed by: HOSPITALIST

## 2022-11-08 PROCEDURE — 99233 SBSQ HOSP IP/OBS HIGH 50: CPT | Performed by: INTERNAL MEDICINE

## 2022-11-08 PROCEDURE — 1200000000 HC SEMI PRIVATE

## 2022-11-08 PROCEDURE — 94761 N-INVAS EAR/PLS OXIMETRY MLT: CPT

## 2022-11-08 PROCEDURE — 80053 COMPREHEN METABOLIC PANEL: CPT

## 2022-11-08 PROCEDURE — 6370000000 HC RX 637 (ALT 250 FOR IP): Performed by: STUDENT IN AN ORGANIZED HEALTH CARE EDUCATION/TRAINING PROGRAM

## 2022-11-08 PROCEDURE — 36415 COLL VENOUS BLD VENIPUNCTURE: CPT

## 2022-11-08 PROCEDURE — 87040 BLOOD CULTURE FOR BACTERIA: CPT

## 2022-11-08 PROCEDURE — 84145 PROCALCITONIN (PCT): CPT

## 2022-11-08 PROCEDURE — 6360000002 HC RX W HCPCS: Performed by: STUDENT IN AN ORGANIZED HEALTH CARE EDUCATION/TRAINING PROGRAM

## 2022-11-08 PROCEDURE — 99223 1ST HOSP IP/OBS HIGH 75: CPT | Performed by: INTERNAL MEDICINE

## 2022-11-08 PROCEDURE — 6370000000 HC RX 637 (ALT 250 FOR IP): Performed by: HOSPITALIST

## 2022-11-08 RX ADMIN — CLOPIDOGREL BISULFATE 75 MG: 75 TABLET ORAL at 08:14

## 2022-11-08 RX ADMIN — LISINOPRIL 5 MG: 5 TABLET ORAL at 08:14

## 2022-11-08 RX ADMIN — ENOXAPARIN SODIUM 30 MG: 100 INJECTION SUBCUTANEOUS at 20:58

## 2022-11-08 RX ADMIN — METOPROLOL TARTRATE 12.5 MG: 25 TABLET, FILM COATED ORAL at 20:58

## 2022-11-08 RX ADMIN — ATORVASTATIN CALCIUM 40 MG: 40 TABLET, FILM COATED ORAL at 20:58

## 2022-11-08 RX ADMIN — CEFTRIAXONE SODIUM 1000 MG: 1 INJECTION, POWDER, FOR SOLUTION INTRAMUSCULAR; INTRAVENOUS at 08:28

## 2022-11-08 RX ADMIN — ENOXAPARIN SODIUM 30 MG: 100 INJECTION SUBCUTANEOUS at 08:16

## 2022-11-08 RX ADMIN — SODIUM CHLORIDE, PRESERVATIVE FREE 10 ML: 5 INJECTION INTRAVENOUS at 20:58

## 2022-11-08 RX ADMIN — ASPIRIN 81 MG CHEWABLE TABLET 81 MG: 81 TABLET CHEWABLE at 08:14

## 2022-11-08 RX ADMIN — SODIUM CHLORIDE, PRESERVATIVE FREE 10 ML: 5 INJECTION INTRAVENOUS at 08:20

## 2022-11-08 RX ADMIN — METOPROLOL TARTRATE 12.5 MG: 25 TABLET, FILM COATED ORAL at 08:15

## 2022-11-08 RX ADMIN — FAMOTIDINE 20 MG: 20 TABLET ORAL at 20:58

## 2022-11-08 RX ADMIN — FAMOTIDINE 20 MG: 20 TABLET ORAL at 08:15

## 2022-11-08 NOTE — PLAN OF CARE
Problem: Discharge Planning  Goal: Discharge to home or other facility with appropriate resources  11/8/2022 0156 by Alex Anthony RN  Outcome: Progressing  11/7/2022 1821 by Jeanie Mendes RN  Outcome: Progressing     Problem: Pain  Goal: Verbalizes/displays adequate comfort level or baseline comfort level  11/8/2022 0156 by Alex Anthony RN  Outcome: Progressing  11/7/2022 1821 by Jeanie Mendes RN  Outcome: Progressing     Problem: Safety - Adult  Goal: Free from fall injury  11/8/2022 0156 by Alex Anthony RN  Outcome: Progressing  11/7/2022 1821 by Jeanie Mendes RN  Outcome: Progressing     Problem: Skin/Tissue Integrity  Goal: Absence of new skin breakdown  Description: 1. Monitor for areas of redness and/or skin breakdown  2. Assess vascular access sites hourly  3. Every 4-6 hours minimum:  Change oxygen saturation probe site  4. Every 4-6 hours:  If on nasal continuous positive airway pressure, respiratory therapy assess nares and determine need for appliance change or resting period.   11/8/2022 0156 by Alex Anthony RN  Outcome: Progressing  11/7/2022 1821 by Jeanie Mendes RN  Outcome: Progressing     Problem: ABCDS Injury Assessment  Goal: Absence of physical injury  11/8/2022 0156 by Alex Anthony RN  Outcome: Progressing  11/7/2022 1821 by Jeanie Mendes RN  Outcome: Progressing

## 2022-11-08 NOTE — CARE COORDINATION
Reviewed chart and discussed in IDR, plan is home today, RW delivered to room. Pt wanted Home care but has not been to PCP in recent past so will need to see PCP then get Bill. CM will update nursing and pt.

## 2022-11-08 NOTE — PROGRESS NOTES
Cardiology Progress Note       Admit Date:  11/4/2022    Consult reason/ Seen today for: CAD/ CHF    Subjective and  Overnight Events: patient is sitting up in bed and is feeling better. He states understanding for why the  stress is on hold. Chest pain no  Shortness of breath no  Palpitations no  Dizziness no  Swelling no      Assessment and Plan:  CAD: stress on hold given his severe anemia. Hgb 7.8 this morning. Can have stress as an outpatient as he does not have chest pain and troponin is negative. Continue ASA Plavix, metoprolol and lipitor. Anemia: recommend investigation of anemia per primary. HTN: Stable. Continue norvasc, lisinopril and metoprolol  Tobacco use: encouraged to abstain from nicotine      Telemetry Reviewed:   Sinus rhythm        History of Presenting Illness:    Chief complain on admission : 71 y. o.year old who is admitted for  Chief Complaint   Patient presents with    Fatigue    Dehydration        Past medical history:    has a past medical history of Arthritis, Hypertension, and MI, old. Past surgical history:   has a past surgical history that includes Cardiac catheterization and Testicle surgery. Social History:   reports that he has been smoking cigarettes. He has never used smokeless tobacco. He reports current alcohol use. He reports that he does not use drugs. Family history:  family history is not on file. No Known Allergies    Review of Systems   All 14 systems were reviewed and are negative  Except for the positive findings  which as documented     BP (!) 117/99   Pulse 85   Temp 98.5 °F (36.9 °C) (Oral)   Resp 17   Ht 6' 3\" (1.905 m)   Wt 284 lb 0.2 oz (128.8 kg)   SpO2 98%   BMI 35.50 kg/m²     Intake/Output Summary (Last 24 hours) at 11/8/2022 1355  Last data filed at 11/8/2022 0800  Gross per 24 hour   Intake --   Output 1500 ml   Net -1500 ml         Physical Exam  Vitals reviewed. Constitutional:       General: He is not in acute distress. Appearance: Normal appearance. He is obese. He is not ill-appearing. HENT:      Head: Atraumatic. Neck:      Vascular: No carotid bruit. Cardiovascular:      Rate and Rhythm: Normal rate and regular rhythm. Pulses: Normal pulses. Heart sounds: Normal heart sounds. No murmur heard. Pulmonary:      Effort: Pulmonary effort is normal. No respiratory distress. Breath sounds: Normal breath sounds. Musculoskeletal:         General: No swelling or deformity. Cervical back: Neck supple. No muscular tenderness. Neurological:      Mental Status: He is alert. Medications:    lisinopril  5 mg Oral Daily    sodium chloride flush  5-40 mL IntraVENous 2 times per day    enoxaparin  30 mg SubCUTAneous BID    famotidine  20 mg Oral BID    cefTRIAXone (ROCEPHIN) IV  1,000 mg IntraVENous Q24H    atorvastatin  40 mg Oral Nightly    amLODIPine  5 mg Oral Daily    aspirin  81 mg Oral Daily    clopidogrel  75 mg Oral Daily    metoprolol tartrate  12.5 mg Oral BID      sodium chloride 25 mL (11/06/22 1005)     sodium chloride flush, sodium chloride, ondansetron **OR** ondansetron, polyethylene glycol, potassium chloride **OR** potassium alternative oral replacement **OR** potassium chloride, magnesium sulfate    Lab Data:  CBC:   Recent Labs     11/06/22 0535 11/07/22 0428 11/08/22  0334   WBC 10.9* 10.1 9.6   HGB 8.1* 7.9* 7.8*   HCT 25.6* 24.9* 24.6*   MCV 98.8 98.4 96.9    386 372       BMP:   Recent Labs     11/06/22 0535 11/07/22 0428 11/08/22  0334   * 134* 134*   K 3.6 4.0 4.1    103 101   CO2 20* 21 21   BUN 9 8 6   CREATININE 1.0 0.9 0.8*       PT/INR:   Recent Labs     11/06/22 0535   PROTIME 16.4*   INR 1.27       BNP:    No results for input(s): PROBNP in the last 72 hours.     TROPONIN:   Recent Labs     11/06/22 0535   TROPONINT <0.010          All labs, medications and tests reviewed by myself , continue all other medications of all above medical condition listed as is except for changes mentioned above. Thank you very much for consult , please call with questions. Electronically signed by LAURYN Chery CNP on 11/8/2022 at 1:55 PM        [address] [facility]    I have seen, spoken to and examined this patient personally, independent of the NP/PAC. I have reviewed the hospital care given to date and reviewed all pertinent labs and imaging. I have spoken with patient, nursing staff and provided written and verbal instructions . The above note has been reviewed. I have spent substantive amount of time in formulating patient care. Physical Exam:    General:   Awake, alert  Head:normal  Eye:normal  Chest:   Clear to auscultation  0 Basilar crackles          MEDICAL DECISION MAKING :             History of non-STEMI/CAD/  s/p PCI, left circumflex, 2015, Dr. Nghia Davison, [facility]  Essential hypertension  Hyperlipidemia  Elevated proBNP however patient clinically dehydrated and currently on IV fluids. Continue     Patient denies any active chest pain  Complains of shortness of breath with exertion  Continue with aspirin 81 mg daily  Continue with Plavix 25 mg daily  Continue with metoprolol tartrate 12.5 mg twice daily  Continue lisinopril 10 mg p.o. daily  Continue with Lipitor 40 mg daily  Continue with Norvasc 5 mg p.o. daily       Patient remains anemic with hemoglobin less than 8. Recommend stress test as outpatient   please call us with any questions.     Pyelonephritis/urinary tract infection: IV antibiotics  Hypomagnesemia: Replete magnesium  Anemia/diarrhea: GI follow-up IV fluids  Tobacco abuse: Counseled against smoking  Morbid obesity: Advised low-fat diet and exercise as tolerated         Dr. Fidel Virk MD

## 2022-11-08 NOTE — PROGRESS NOTES
Occupational Therapy      Occupational Therapy Treatment Note    Name: Marin Clancy MRN: [id number] :   1950   Date:  2022   Admission Date: 2022 Room:  [address] [place] [address]-A     Primary Problem:  Debility    Restrictions/Precautions:          General Precautions, Fall Risk, Contact Precautions    Communication with other providers: Nursing handoff    Subjective:  Patient states:  \"I can walk farther today\"  Pain:   Location, Type, Intensity (0/10 to 10/10):  No    Objective:    Observation: Pt received sitting EOB upon arrival, agreeable to therapy   Objective Measures:  [facility]    Treatment, including education:  Therapeutic Activity Training:   Therapeutic activity training was instructed today. Cues were given for safety, sequence, UE/LE placement, awareness, and balance. Activities performed today included STS, functional mobility, stand to sit    STS from EOB up to RW SBA, functional mobility w/ RW SBA to/from room, stand to sit SBA.     Pt sitting EOB upon arrival, agreeable to therapy    Assessment / Impression:    Patient's tolerance of treatment: Well  Adverse Reaction: None  Significant change in status and impact: Improved from previous session  Barriers to improvement: None      Plan for Next Session:    Continue OT POC    Time in:  1231  Time out:  1240  Timed treatment minutes:  9 minutes  Total treatment time:  9 minutes      Electronically signed by:    Dhaval VILLA/RIA [id number]  12:58 PM,2022

## 2022-11-08 NOTE — PROGRESS NOTES
Hospitalist Progress Note      Name:  Katarina Gilmore /Age/Sex: 1950  (71 y.o. male)   MRN & CSN:  [id number] & [id number] Admission Date/Time: 2022  4:44 PM   Location:  [address] [place] [address] PCP: Marisol Macias MD         Hospital Day: 5    Assessment and Plan:   Katarina Gilmore is a 71 y.o.  male  who presents with Debility      Pyelonephritis- Fever. Leucocytosis trending down. Dirty urine. Culture E coli. Sensitive to Cefazolin. LA - negative. Blood culture  staph epidermis  likely contamination. But patient spiked fever . ID consulted for feedback. continue Ceftriaxone for now, adjusted dose to 2 g daily per ID. We will also repeat Pro-Paul in the morning, blood cultures ordered . Diarrhea - Resolved. Mostly watery. Stool work up negative (GI PCR/ c diff) . No recent Abx use. GI following    3. Dehydration - Improving. Generalized fatigue. S/p IV hydration    4. CAD s/p PCI of the left circumflex coronary artery in  - stable. No chest pain. Trop negative. Cardiology following. Patient needs Stress test .  Stress test was held due hb <8. I discussed with Dr. Kim Geronimo if we need to transfuse. Advise just monitor for now, evaluated this morning and stress test was canceled, it may be done outpatient. 5.  Elevated BNP - Reports history of 2 stents. Unaware of HF but takes lasix for dependent edema. Chest xray - vascular congestion. Currently saturating well in RA. ECHO. Hold off on lasix. Cardiology consulted. 6.  Hypovolemic Hyponatremia. Resolving. Likely from poor oral intake. S/p IV NS. Oral hydration. 7.  Hypomagnesemia - corrected. 8.  Anemia: Hemoglobin 8.9 on admission. No active bleeding reported. Hemoglobin was 13.one year back. Iron indices ordered as well. GI consulted. EGD / Colonoscopy as OP. CT abdomen - S/O pyelonephritis. FOBT negative. Iron panel no s/o VALENTIN. H/H currently stable      9. HTN - continue home meds    Diet ADULT DIET; Regular;  Low Sodium (2 gm) DVT Prophylaxis [] Lovenox, []  Heparin, [] SCDs, [] Ambulation   GI Prophylaxis [] PPI,  [] H2 Blocker,  [] Carafate,  [] Diet/Tube Feeds   Code Status Full Code   Disposition Patient requires continued admission due to recent fever, further monitoring   MDM [] Low, [] Moderate,[]  High  Patient's risk as above due to multiple comorb     History of Present Illness:     Chief Complaint: Pako Lenz is a 71 y.o.  male  who presents with PMH NSTEMI, CAD s/p PCI of the left circumflex coronary artery in 2015, hypertension, dependent peripheral edema, and tobacco presented with excessive fatigue    Patient had one recorded fever but he himself does not feel feverish. Denies any chills, shivering and reports overall improvement in his energy level. Diarrhea has resolved     Ten point ROS reviewed negative, except as above    Objective: Intake/Output Summary (Last 24 hours) at 11/8/2022 1300  Last data filed at 11/8/2022 0800  Gross per 24 hour   Intake --   Output 1500 ml   Net -1500 ml        Vitals:   Vitals:    11/08/22 0800   BP: (!) 117/99   Pulse: 85   Resp: 17   Temp: 98.5 °F (36.9 °C)   SpO2: 98%     Physical Exam:   GEN Awake male, sitting upright in bed in no apparent distress. Appears given age. EYES Pupils are equally round. No scleral erythema, discharge, or conjunctivitis. HENT Mucous membranes are moist. Oral pharynx without exudates, no evidence of thrush. NECK Supple, no apparent thyromegaly or masses. RESP Clear to auscultation, no wheezes, rales or rhonchi. Symmetric chest movement while on room air. CARDIO/VASC S1/S2 auscultated. Regular rate without appreciable murmurs, rubs, or gallops. No JVD or carotid bruits. Peripheral pulses equal bilaterally and palpable. No peripheral edema. GI Abdomen is soft. Nontender nondistended   No costovertebral angle tenderness. Normal appearing external genitalia. Conte catheter is not present.   HEME/LYMPH No palpable cervical lymphadenopathy and no hepatosplenomegaly. No petechiae or ecchymoses. MSK No gross joint deformities. SKIN Normal coloration, warm, dry. NEURO Cranial nerves appear grossly intact, normal speech, no lateralizing weakness. PSYCH Awake, alert, oriented x 4. Affect appropriate. Medications:   Medications:    lisinopril  5 mg Oral Daily    sodium chloride flush  5-40 mL IntraVENous 2 times per day    enoxaparin  30 mg SubCUTAneous BID    famotidine  20 mg Oral BID    cefTRIAXone (ROCEPHIN) IV  1,000 mg IntraVENous Q24H    atorvastatin  40 mg Oral Nightly    amLODIPine  5 mg Oral Daily    aspirin  81 mg Oral Daily    clopidogrel  75 mg Oral Daily    metoprolol tartrate  12.5 mg Oral BID      Infusions:    sodium chloride 25 mL (11/06/22 1005)     PRN Meds: sodium chloride flush, 5-40 mL, PRN  sodium chloride, , PRN  ondansetron, 4 mg, Q8H PRN   Or  ondansetron, 4 mg, Q6H PRN  polyethylene glycol, 17 g, Daily PRN  potassium chloride, 40 mEq, PRN   Or  potassium alternative oral replacement, 40 mEq, PRN   Or  potassium chloride, 10 mEq, PRN  magnesium sulfate, 2,000 mg, PRN        Patient is still admitted because intractable diarrhea . The anticipated discharge is in less than 24 hours.      Electronically signed by Leland Noble MD on 11/8/2022 at 1:00 PM

## 2022-11-08 NOTE — CARE COORDINATION
HHC can not be initiated. Rachelle Bella at Dr [facility] office stated pt has not been there is approx a yr. MD will not order hhc until pt is see in his office. Pt allison Gee from case mgmt made aware.

## 2022-11-08 NOTE — CONSULTS
Infectious Disease Consult Note  2022   Patient Name: Nicol Rivera : 1950     Assessment  Bilateral pyelonephritis  Urine culture positive for E. Coli  Elevated procalcitonin which peaked at 19 ng/mL now down to 6 implying improvement. Fever is likely secondary to pyelonephritis, fever could last for as many as 5 days  Staph epidermidis in blood culture: It is a contaminant  Diverticulosis without diverticulitis  Comorbid conditions:     Plan  Therapeutic: Increased dose of ceftriaxone to 2 g daily, recommend total duration of therapy of 14 days  Diagnostic: Trend CRP and procalcitonin  F/u: Repeat blood cultures that were drawn on 2022  Other: Discussed with Omar Burger    Thank you for allowing me to consult in the care of this patient.  ------------------------  REASON FOR CONSULT: Infective syndrome   Requested by: Dr. Lara Aguiar is a 71 y.o. male who was admitted 2022 for further evaluation and management of fatigue, thirst, chills, diarrhea of 1 week duration. He sought care with his primary care provider who was prescribed Tylenol and Imodium. He took this and his diarrhea improved. He had reported lightheadedness when he stands from a sitting position. In the emergency department he was found to have hyponatremia (128). He was admitted for debility. He had a fever 100.6 °F on 2022 for cytosis 15,600/mm³. Ceftriaxone was started on 2022. Urine culture was positive for E. coli (described as multidrug-resistant organism-resistant to Bactrim, fluoroquinolones, ampicillin). CT of the abdomen and pelvis showed bilateral perinephric stranding. 2 sets of blood cultures was positive for Staphylococcus epidermidis. He had a fever 100.5 °F on 2022. He denies headache, chest pain, cough, abdominal pain. He ambulates with a cane due to arthritis  ?   Infectious diseases service was consulted to evaluate the pt, and recommend further investigative and therapeutic measures. Review and summary of old records:  ROS: Other systems reviewed Including eyes, ENT, respiratory, cardiovascular, GI, , dermatologic, neurologic, psych, hem/lymphatic, musculoskeletal and endocrine were negative other than what is mentioned above. Patient Active Problem List    Diagnosis Date Noted    Debility 11/04/2022     Past Medical History:   Diagnosis Date    Arthritis     Hypertension     MI, old       Past Surgical History:   Procedure Laterality Date    CARDIAC CATHETERIZATION      TESTICLE SURGERY        History reviewed. No pertinent family history. Infectious disease related family history - not contibutory. SOCIAL HISTORY  Social History     Tobacco Use    Smoking status: Some Days     Types: Cigarettes    Smokeless tobacco: Never   Substance Use Topics    Alcohol use: Yes     Comment: rarely      Ancestry: Black     ? ALLERGIES  No Known Allergies   MEDICATIONS  Reviewed and are per the chart/EMR. IMMUNIZATION HISTORY  Immunization History   Administered Date(s) Administered    COVID-19, PFIZER PURPLE top, DILUTE for use, (age 15 y+), 30mcg/0.3mL 03/18/2021, 04/13/2021, 12/15/2021     ? Antibiotics:   Ceftriaxone  ?  -------------------------------------------------------------------------------------------------------------------    Vital Signs:  Vitals:    11/08/22 0800   BP: (!) 117/99   Pulse: 85   Resp: 17   Temp: 98.5 °F (36.9 °C)   SpO2: 98%         Exam:    VS: noted; wt 128.8 kg  Gen: alert and oriented X3, no distress  Skin: no stigmata of endocarditis  Wounds: C/D/I  HEMT: AT/NC Oropharynx pink, moist, and without lesions or exudates; edentulous, has full upper and lower dentures  Eyes: PERRLA, EOMI, conjunctiva pink, sclera anicteric. Neck: Supple. Trachea midline. No LAD. Chest: no distress and CTA. Good air movement. Heart: RRR and no MRG. Abd: soft, non-distended, no CVA tenderness, no hepatomegaly. Normoactive bowel sounds.   Ext: no clubbing, cyanosis, or edema  Catheter Site: without erythema or tenderness  LDA:   Neuro: Mental status intact. CN 2-12 intact and no focal sensory or motor deficits    ? Diagnostic Studies: reviewed  ? ? I have examined this patient and available medical records on this date and have made the above observations, conclusions and recommendations.   Electronically signed by: Electronically signed by Severino Majano MD on 11/8/2022 at 12:53 PM

## 2022-11-09 VITALS
RESPIRATION RATE: 16 BRPM | SYSTOLIC BLOOD PRESSURE: 104 MMHG | DIASTOLIC BLOOD PRESSURE: 64 MMHG | HEART RATE: 60 BPM | OXYGEN SATURATION: 98 % | BODY MASS INDEX: 35.69 KG/M2 | WEIGHT: 287.04 LBS | TEMPERATURE: 97.9 F | HEIGHT: 75 IN

## 2022-11-09 LAB — PROCALCITONIN: 3.74

## 2022-11-09 PROCEDURE — 94761 N-INVAS EAR/PLS OXIMETRY MLT: CPT

## 2022-11-09 PROCEDURE — 2580000003 HC RX 258: Performed by: INTERNAL MEDICINE

## 2022-11-09 PROCEDURE — 76937 US GUIDE VASCULAR ACCESS: CPT

## 2022-11-09 PROCEDURE — 6360000002 HC RX W HCPCS: Performed by: INTERNAL MEDICINE

## 2022-11-09 PROCEDURE — 2580000003 HC RX 258: Performed by: HOSPITALIST

## 2022-11-09 PROCEDURE — 6370000000 HC RX 637 (ALT 250 FOR IP): Performed by: HOSPITALIST

## 2022-11-09 PROCEDURE — 05H933Z INSERTION OF INFUSION DEVICE INTO RIGHT BRACHIAL VEIN, PERCUTANEOUS APPROACH: ICD-10-PCS | Performed by: HOSPITALIST

## 2022-11-09 PROCEDURE — 36415 COLL VENOUS BLD VENIPUNCTURE: CPT

## 2022-11-09 PROCEDURE — 6370000000 HC RX 637 (ALT 250 FOR IP): Performed by: NURSE PRACTITIONER

## 2022-11-09 PROCEDURE — 6370000000 HC RX 637 (ALT 250 FOR IP): Performed by: STUDENT IN AN ORGANIZED HEALTH CARE EDUCATION/TRAINING PROGRAM

## 2022-11-09 PROCEDURE — 99232 SBSQ HOSP IP/OBS MODERATE 35: CPT | Performed by: INTERNAL MEDICINE

## 2022-11-09 PROCEDURE — 2709999900 HC NON-CHARGEABLE SUPPLY

## 2022-11-09 PROCEDURE — 84145 PROCALCITONIN (PCT): CPT

## 2022-11-09 PROCEDURE — 36410 VNPNXR 3YR/> PHY/QHP DX/THER: CPT

## 2022-11-09 RX ORDER — ATORVASTATIN CALCIUM 40 MG/1
40 TABLET, FILM COATED ORAL NIGHTLY
Qty: 30 TABLET | Refills: 3 | Status: SHIPPED | OUTPATIENT
Start: 2022-11-09

## 2022-11-09 RX ORDER — SODIUM CHLORIDE 0.9 % (FLUSH) 0.9 %
5-40 SYRINGE (ML) INJECTION EVERY 12 HOURS SCHEDULED
Status: DISCONTINUED | OUTPATIENT
Start: 2022-11-09 | End: 2022-11-09 | Stop reason: HOSPADM

## 2022-11-09 RX ORDER — LIDOCAINE HYDROCHLORIDE 10 MG/ML
5 INJECTION, SOLUTION EPIDURAL; INFILTRATION; INTRACAUDAL; PERINEURAL ONCE
Status: DISCONTINUED | OUTPATIENT
Start: 2022-11-09 | End: 2022-11-09 | Stop reason: HOSPADM

## 2022-11-09 RX ORDER — CEFTRIAXONE 500 MG/1
500 INJECTION, POWDER, FOR SOLUTION INTRAMUSCULAR; INTRAVENOUS EVERY 24 HOURS
Qty: 2 EACH | Refills: 0 | Status: ON HOLD | OUTPATIENT
Start: 2022-11-09 | End: 2022-11-11 | Stop reason: HOSPADM

## 2022-11-09 RX ORDER — CLOPIDOGREL BISULFATE 75 MG/1
75 TABLET ORAL DAILY
Qty: 30 TABLET | Refills: 3 | Status: SHIPPED | OUTPATIENT
Start: 2022-11-10

## 2022-11-09 RX ORDER — ASPIRIN 81 MG/1
81 TABLET, CHEWABLE ORAL DAILY
Qty: 30 TABLET | Refills: 3 | Status: SHIPPED | OUTPATIENT
Start: 2022-11-10

## 2022-11-09 RX ORDER — SODIUM CHLORIDE 0.9 % (FLUSH) 0.9 %
5-40 SYRINGE (ML) INJECTION PRN
Status: DISCONTINUED | OUTPATIENT
Start: 2022-11-09 | End: 2022-11-09 | Stop reason: HOSPADM

## 2022-11-09 RX ORDER — SODIUM CHLORIDE 9 MG/ML
INJECTION, SOLUTION INTRAVENOUS PRN
Status: DISCONTINUED | OUTPATIENT
Start: 2022-11-09 | End: 2022-11-09 | Stop reason: HOSPADM

## 2022-11-09 RX ORDER — FAMOTIDINE 20 MG/1
20 TABLET, FILM COATED ORAL 2 TIMES DAILY
Qty: 60 TABLET | Refills: 3 | Status: SHIPPED | OUTPATIENT
Start: 2022-11-09

## 2022-11-09 RX ORDER — AMLODIPINE BESYLATE 5 MG/1
5 TABLET ORAL DAILY
Qty: 30 TABLET | Refills: 3 | Status: SHIPPED | OUTPATIENT
Start: 2022-11-10

## 2022-11-09 RX ORDER — CEFTRIAXONE 500 MG/1
2000 INJECTION, POWDER, FOR SOLUTION INTRAMUSCULAR; INTRAVENOUS EVERY 24 HOURS
Qty: 8 EACH | Refills: 0 | Status: ON HOLD | OUTPATIENT
Start: 2022-11-09 | End: 2022-11-11 | Stop reason: SDUPTHER

## 2022-11-09 RX ORDER — LISINOPRIL 5 MG/1
5 TABLET ORAL DAILY
Qty: 30 TABLET | Refills: 3 | Status: SHIPPED | OUTPATIENT
Start: 2022-11-10

## 2022-11-09 RX ADMIN — METOPROLOL TARTRATE 12.5 MG: 25 TABLET, FILM COATED ORAL at 09:29

## 2022-11-09 RX ADMIN — LISINOPRIL 5 MG: 5 TABLET ORAL at 09:29

## 2022-11-09 RX ADMIN — CEFTRIAXONE SODIUM 2000 MG: 2 INJECTION, POWDER, FOR SOLUTION INTRAMUSCULAR; INTRAVENOUS at 10:03

## 2022-11-09 RX ADMIN — CLOPIDOGREL BISULFATE 75 MG: 75 TABLET ORAL at 09:33

## 2022-11-09 RX ADMIN — ASPIRIN 81 MG CHEWABLE TABLET 81 MG: 81 TABLET CHEWABLE at 09:31

## 2022-11-09 RX ADMIN — SODIUM CHLORIDE, PRESERVATIVE FREE 10 ML: 5 INJECTION INTRAVENOUS at 09:33

## 2022-11-09 RX ADMIN — FAMOTIDINE 20 MG: 20 TABLET ORAL at 09:29

## 2022-11-09 RX ADMIN — AMLODIPINE BESYLATE 5 MG: 5 TABLET ORAL at 09:31

## 2022-11-09 ASSESSMENT — PAIN SCALES - GENERAL
PAINLEVEL_OUTOF10: 0
PAINLEVEL_OUTOF10: 0

## 2022-11-09 NOTE — PLAN OF CARE
Problem: Discharge Planning  Goal: Discharge to home or other facility with appropriate resources  11/9/2022 1618 by Lisa Park RN  Outcome: Completed  11/9/2022 0311 by Irina Rebollar RN  Outcome: Progressing     Problem: Pain  Goal: Verbalizes/displays adequate comfort level or baseline comfort level  11/9/2022 1618 by Lisa Park RN  Outcome: Completed  11/9/2022 0311 by Irina Rebollar RN  Outcome: Progressing     Problem: Safety - Adult  Goal: Free from fall injury  11/9/2022 1618 by Lisa Park RN  Outcome: Completed  11/9/2022 0311 by Irina Rebollar RN  Outcome: Progressing     Problem: Skin/Tissue Integrity  Goal: Absence of new skin breakdown  Description: 1. Monitor for areas of redness and/or skin breakdown  2. Assess vascular access sites hourly  3. Every 4-6 hours minimum:  Change oxygen saturation probe site  4. Every 4-6 hours:  If on nasal continuous positive airway pressure, respiratory therapy assess nares and determine need for appliance change or resting period.   11/9/2022 1618 by Lisa Park RN  Outcome: Completed  11/9/2022 0311 by Irina Rebollar RN  Outcome: Progressing     Problem: ABCDS Injury Assessment  Goal: Absence of physical injury  11/9/2022 1618 by Lisa Park RN  Outcome: Completed  11/9/2022 0311 by Irina Rebollar RN  Outcome: Progressing  Flowsheets (Taken 11/9/2022 0309)  Absence of Physical Injury: Implement safety measures based on patient assessment

## 2022-11-09 NOTE — PROGRESS NOTES
Hospitalist Progress Note      Name:  Mohini Quijano /Age/Sex: 1950  (71 y.o. male)   MRN & CSN:  [id number] & [id number] Admission Date/Time: 2022  4:44 PM   Location:  [address] [place] [address] PCP: Belinda Lovell MD         Hospital Day: 6    Assessment and Plan:   Mohini Quijano is a 71 y.o.  male  who presents with Debility      Pyelonephritis- Fever. Leucocytosis trending down. Dirty urine. Culture E coli. Sensitive to Cefazolin. LA - negative. Blood culture  staph epidermis  likely contamination. But patient spiked fever . ID consulted for feedback. continue Ceftriaxone for now, adjusted dose to 2 g daily per ID. We will also repeat Pro-Paul in the morning, blood cultures ordered . Awaiting final ID recs on potential oral regimen for antibiotics versus the need for IV antibiotics on discharge, plan for 14 days of antibiotics, will discharge pending that. Diarrhea - Resolved. Mostly watery. Stool work up negative (GI PCR/ c diff) . No recent Abx use. GI following    3. Dehydration - Improving. Generalized fatigue. S/p IV hydration    4. CAD s/p PCI of the left circumflex coronary artery in  - stable. No chest pain. Trop negative. Cardiology following. Patient needs Stress test .  Stress test was held due hb <8. I discussed with Dr. Jaida Edge if we need to transfuse. Advise just monitor for now, evaluated this morning and stress test was canceled, it may be done outpatient. 5.  Elevated BNP - Reports history of 2 stents. Unaware of HF but takes lasix for dependent edema. Chest xray - vascular congestion. Currently saturating well in RA. ECHO. Hold off on lasix. Cardiology consulted. 6.  Hypovolemic Hyponatremia. Resolving. Likely from poor oral intake. S/p IV NS. Oral hydration. 7.  Hypomagnesemia - corrected. 8.  Anemia: Hemoglobin 8.9 on admission. No active bleeding reported. Hemoglobin was 13.one year back. Iron indices ordered as well. GI consulted.  EGD / Colonoscopy as OP. CT abdomen - S/O pyelonephritis. FOBT negative. Iron panel no s/o VALENTIN. H/H currently stable      9. HTN - continue home meds    Diet ADULT DIET; Regular; Low Sodium (2 gm)   DVT Prophylaxis [] Lovenox, []  Heparin, [] SCDs, [] Ambulation   GI Prophylaxis [] PPI,  [] H2 Blocker,  [] Carafate,  [] Diet/Tube Feeds   Code Status Full Code   Disposition Patient requires continued admission due to recent fever, further monitoring   MDM [] Low, [] Moderate,[]  High  Patient's risk as above due to multiple comorb     History of Present Illness:     Chief Complaint: Maureen Gates is a 71 y.o.  male  who presents with PMH NSTEMI, CAD s/p PCI of the left circumflex coronary artery in 2015, hypertension, dependent peripheral edema, and tobacco presented with excessive fatigue    Still feels okay this morning, no complaints, no shortness of breath, no fever no chills no rigors. Ten point ROS reviewed negative, except as above    Objective:   No intake or output data in the 24 hours ending 11/09/22 1132     Vitals:   Vitals:    11/09/22 0929   BP: 128/82   Pulse: 81   Resp:    Temp:    SpO2:      Physical Exam:   GEN Awake male, sitting upright in bed in no apparent distress. Appears given age. EYES Pupils are equally round. No scleral erythema, discharge, or conjunctivitis. HENT Mucous membranes are moist. Oral pharynx without exudates, no evidence of thrush. NECK Supple, no apparent thyromegaly or masses. RESP Clear to auscultation, no wheezes, rales or rhonchi. Symmetric chest movement while on room air. CARDIO/VASC S1/S2 auscultated. Regular rate without appreciable murmurs, rubs, or gallops. No JVD or carotid bruits. Peripheral pulses equal bilaterally and palpable. No peripheral edema. GI Abdomen is soft. Nontender nondistended   No costovertebral angle tenderness. Normal appearing external genitalia. Conte catheter is not present.   HEME/LYMPH No palpable cervical lymphadenopathy and no hepatosplenomegaly. No petechiae or ecchymoses. MSK No gross joint deformities. SKIN Normal coloration, warm, dry. NEURO Cranial nerves appear grossly intact, normal speech, no lateralizing weakness. PSYCH Awake, alert, oriented x 4. Affect appropriate. Medications:   Medications:    cefTRIAXone (ROCEPHIN) IV  2,000 mg IntraVENous Q24H    lisinopril  5 mg Oral Daily    sodium chloride flush  5-40 mL IntraVENous 2 times per day    enoxaparin  30 mg SubCUTAneous BID    famotidine  20 mg Oral BID    atorvastatin  40 mg Oral Nightly    amLODIPine  5 mg Oral Daily    aspirin  81 mg Oral Daily    clopidogrel  75 mg Oral Daily    metoprolol tartrate  12.5 mg Oral BID      Infusions:    sodium chloride 25 mL (11/06/22 1005)     PRN Meds: sodium chloride flush, 5-40 mL, PRN  sodium chloride, , PRN  ondansetron, 4 mg, Q8H PRN   Or  ondansetron, 4 mg, Q6H PRN  polyethylene glycol, 17 g, Daily PRN  potassium chloride, 40 mEq, PRN   Or  potassium alternative oral replacement, 40 mEq, PRN   Or  potassium chloride, 10 mEq, PRN  magnesium sulfate, 2,000 mg, PRN        Patient is still admitted because intractable diarrhea . The anticipated discharge is in less than 24 hours.      Electronically signed by Valentín Castellanos MD on 11/9/2022 at 11:32 AM

## 2022-11-09 NOTE — PROGRESS NOTES
.Infectious Disease Progress Note  2022   Patient Name: Queen Bryon : 1950     Assessment  Bilateral pyelonephritis  Feels better. Urine culture positive for E. Coli  Staph epidermidis in blood culture: It is a contaminant  Diverticulosis without diverticulitis  Comorbid conditions:      Plan  Therapeutic: Increased dose of ceftriaxone to 2 g daily, recommend total duration of therapy of 14 days (end-date: 2022)  After discharge the following should be done:  Weekly labs drawn on Monday during the course of treatment  CBC with differential, CMP, ESR, CRP  Cathflo for blocked vascular access as needed  Fax results to Attn: [place] [facility]  # [phone]  See in clinic within one week after discharge  Disposition:   Diagnostic: Trend CRP and procalcitonin  F/u: Repeat blood cultures that were drawn on 2022  Other:     Reason for visit: F/u pyelonephritis  History:? Interval history noted  Denies n/v/d/f or untoward effects of antimicrobials  Physical Exam:  Vital Signs: /78   Pulse 78   Temp 98.7 °F (37.1 °C) (Oral)   Resp 16   Ht 6' 3\" (1.905 m)   Wt 287 lb 0.6 oz (130.2 kg)   SpO2 97%   BMI 35.88 kg/m²     Gen: alert and oriented X3, no distress  Skin: no stigmata of endocarditis  Wounds: C/D/I  HEMT: AT/NC Oropharynx pink, moist, and without lesions or exudates; dentition in good state of repair  Eyes: PERRLA, EOMI, conjunctiva pink, sclera anicteric. Neck: Supple. Trachea midline. No LAD. Chest: no distress and CTA. Good air movement. Heart: RRR and no MRG. Abd: soft, non-distended, no tenderness, no hepatomegaly. Normoactive bowel sounds. Ext: no clubbing, cyanosis, or edema  Catheter Site: without erythema or tenderness  LDA:   Neuro: Mental status intact. CN 2-12 intact and no focal sensory or motor deficits     Radiologic / Imaging / TESTING  No results found.      Labs:    Recent Results (from the past 24 hour(s))   Procalcitonin Collection Time: 11/09/22  9:46 AM   Result Value Ref Range    Procalcitonin 3.74      CULTURE results: Invalid input(s): BLOOD CULTURE,  URINE CULTURE, SURGICAL CULTURE    Diagnosis:  Patient Active Problem List   Diagnosis    Debility    Pyelonephritis    Fever       Active Problems  Principal Problem:    Debility  Active Problems:    Pyelonephritis    Fever  Resolved Problems:    * No resolved hospital problems.  *              Electronically signed by: Electronically signed by Gretchen Payne MD on 11/9/2022 at 3:13 PM

## 2022-11-09 NOTE — PLAN OF CARE
Problem: Discharge Planning  Goal: Discharge to home or other facility with appropriate resources  Outcome: Progressing     Problem: Pain  Goal: Verbalizes/displays adequate comfort level or baseline comfort level  Outcome: Progressing     Problem: Safety - Adult  Goal: Free from fall injury  Outcome: Progressing     Problem: Skin/Tissue Integrity  Goal: Absence of new skin breakdown  Description: 1. Monitor for areas of redness and/or skin breakdown  2. Assess vascular access sites hourly  3. Every 4-6 hours minimum:  Change oxygen saturation probe site  4. Every 4-6 hours:  If on nasal continuous positive airway pressure, respiratory therapy assess nares and determine need for appliance change or resting period.   Outcome: Progressing     Problem: ABCDS Injury Assessment  Goal: Absence of physical injury  Outcome: Progressing  Flowsheets (Taken 11/9/2022 0308)  Absence of Physical Injury: Implement safety measures based on patient assessment

## 2022-11-09 NOTE — PROGRESS NOTES
Outpatient Pharmacy Progress Note for Meds-to-Beds    Total number of Prescriptions Filled: 5  The following medications were dispensed to the patient during the discharge process:  Aspirin  Famotidine  Lisinopril  Atorvastatin  Metoprolol    Additional Documentation:  Patient picked-up the medication(s) in the OP Pharmacy  The following prescription(s) were refilled to soon per insurance (patient has some at home): Clopidogrel and Amlodipine       Thank you for letting us serve your patients.   [address] [facility] [place]    [address] [facility]    Phone: [phone]    Fax: [phone]

## 2022-11-09 NOTE — DISCHARGE SUMMARY
V2.0  Discharge Summary    Name:  Rachele Braun /Age/Sex: 1950 (71 y.o. male)   Admit Date: 2022  Discharge Date: 22    MRN & CSN:  [id number] & [id number] Encounter Date and Time 22 1:11 PM EST    Attending:  Kaveh Meyer MD Discharging Provider: Kaveh Meyer MD       Hospital Course:     Brief HPI: Rachele Braun is a 71 y.o.  male  who presents with profound debility and inability to carry out his ADLs. Patient states that for the past week or so he has been feeling very weak. Denies any specific complaints like chest pain or abdominal pain. Denies any fever chills or rigors. Denies any nausea vomiting. Does concede to having diarrhea in the past few weeks and was given Lomotil by the family doctor which provided him some relief. Denies any hemoptysis hematemesis melena hematochezia. In the ER his serum chemistries revealed sodium of 128 potassium 4.2 chloride 95 bicarb 21 blood 09 creatinine 1.2 anion gap 12 magnesium 1.7 random glucose 119 serumcalcium is 9, total protein 7.3. proBNP 863. Troponin T less than 0.010. Liver function profile shows albumin 3.2 alkaline phosphatase at 10 ALT 31 AST 26 bilirubin 10 lipase 19 total protein 7.3. Hematology consult WBC 14.6 hemoglobin 8.9 hematocrit 27.8 and platelet count of 121. X-ray chest without any acute process. Patient will be admitted to the hospitalist service. Continue gentle IV hydration. Orthostatic vital signs. PT OT to assess. GI to assess for anemia as inpatient or outpatient would leave that to their discretion    Brief Problem Based Course:   Pyelonephritis- Fever. Leucocytosis trending down. Dirty urine. Culture E coli. Sensitive to Cefazolin. LA - negative. Blood culture  staph epidermis  likely contamination. But patient spiked fever . ID consulted for feedback. continue Ceftriaxone for now, adjusted dose to 2 g daily per ID. We will also repeat Pro-Paul in the morning, blood cultures ordered . Plan was for patient to go home with IV antibiotics after discussion with ID, 14 days of IV ceftriaxone 2 g daily with end date of 11/11. Diarrhea - Resolved. Mostly watery. Stool work up negative (GI PCR/ c diff) . No recent Abx use. GI following    3. Dehydration - Improving. Generalized fatigue. S/p IV hydration     4. CAD s/p PCI of the left circumflex coronary artery in 2015 - stable. No chest pain. Trop negative. Cardiology following. Patient needs Stress test .  Stress test was held due hb <8. I discussed with Dr. Quique Contreras if we need to transfuse. Advise just monitor for now, evaluated again and stress test was canceled, it may be done outpatient. Will refer to Cardiology for continued follow up. Patient was discharged with optimized medical treatment for coronary artery disease, patient is on aspirin, Plavix, metoprolol tartrate 12.5 twice daily in addition to lisinopril and amlodipine for hypertension. 5.  Elevated BNP - Reports history of 2 stents. Unaware of HF but takes lasix for dependent edema. Chest xray - vascular congestion. Currently saturating well in RA. ECHO. Hold off on lasix. Cardiology consulted. 6.  Hypovolemic Hyponatremia. Resolving. Likely from poor oral intake. S/p IV NS. Oral hydration. 7.  Hypomagnesemia - corrected. 8.  Anemia: Hemoglobin 8.9 on admission. No active bleeding reported. Hemoglobin was 13.one year back. Iron indices ordered as well. GI consulted. EGD / Colonoscopy as OP. CT abdomen - S/O pyelonephritis. FOBT negative. Iron panel no s/o VALENTIN. H/H currently stable     9. HTN - continue home meds    The patient expressed appropriate understanding of, and agreement with the discharge recommendations, medications, and plan.      Consults this admission:  IP CONSULT TO GI  IP CONSULT TO CARDIOLOGY  IP CONSULT TO INFECTIOUS DISEASES  IP CONSULT TO HOME CARE NEEDS    Discharge Diagnosis:   Debility    Pyelonephritis     Discharge Instruction:   Follow up appointments: Cardiology   Primary care physician: Sharonda Knight MD within 2 weeks  Diet: cardiac diet  Activity: activity as tolerated  Disposition: Discharged to:   [x]Home, []C, []SNF, []Acute Rehab, []Hospice   Condition on discharge: Stable  Labs and Tests to be Followed up as an outpatient by PCP or Specialist: -    Discharge Medications:        Medication List        START taking these medications      amLODIPine 5 MG tablet  Commonly known as: NORVASC  Take 1 tablet by mouth daily  Start taking on: November 10, 2022     aspirin 81 MG chewable tablet  Take 1 tablet by mouth daily  Start taking on: November 10, 2022     atorvastatin 40 MG tablet  Commonly known as: LIPITOR  Take 1 tablet by mouth nightly     cefTRIAXone 500 MG injection  Commonly known as: ROCEPHIN  Infuse 500 mg intravenously every 24 hours for 2 days     clopidogrel 75 MG tablet  Commonly known as: PLAVIX  Take 1 tablet by mouth daily  Start taking on: November 10, 2022     famotidine 20 MG tablet  Commonly known as: PEPCID  Take 1 tablet by mouth 2 times daily     lisinopril 5 MG tablet  Commonly known as: PRINIVIL;ZESTRIL  Take 1 tablet by mouth daily  Start taking on: November 10, 2022     metoprolol tartrate 25 MG tablet  Commonly known as: LOPRESSOR  Take 0.5 tablets by mouth 2 times daily            CONTINUE taking these medications      ibuprofen 400 MG tablet  Commonly known as: IBU  Take 1 tablet by mouth every 6 hours as needed for Pain               Where to Get Your Medications        These medications were sent to [address] [place] [address] 25, [address] [facility] [id number]      Phone: [phone]   amLODIPine 5 MG tablet  aspirin 81 MG chewable tablet  atorvastatin 40 MG tablet  cefTRIAXone 500 MG injection  clopidogrel 75 MG tablet  famotidine 20 MG tablet  lisinopril 5 MG tablet  metoprolol tartrate 25 MG tablet Objective Findings at Discharge:   /78   Pulse 78   Temp 98.7 °F (37.1 °C) (Oral)   Resp 16   Ht 6' 3\" (1.905 m)   Wt 287 lb 0.6 oz (130.2 kg)   SpO2 97%   BMI 35.88 kg/m²       Physical Exam:   GEN    Awake male, sitting upright in bed in no apparent distress. Appears given age. EYES   Pupils are equally round. No scleral erythema, discharge, or conjunctivitis. HENT  Mucous membranes are moist. Oral pharynx without exudates, no evidence of thrush. NECK  Supple, no apparent thyromegaly or masses. RESP  Clear to auscultation, no wheezes, rales or rhonchi. Symmetric chest movement while on room air. CARDIO/VASC           S1/S2 auscultated. Regular rate without appreciable murmurs, rubs, or gallops. No JVD or carotid bruits. Peripheral pulses equal bilaterally and palpable. No peripheral edema. GI        Abdomen is soft. Nontender nondistended         No costovertebral angle tenderness. Normal appearing external genitalia. Conte catheter is not present. HEME/LYMPH            No palpable cervical lymphadenopathy and no hepatosplenomegaly. No petechiae or ecchymoses. MSK    No gross joint deformities. SKIN    Normal coloration, warm, dry. NEURO           Cranial nerves appear grossly intact, normal speech, no lateralizing weakness. PSYCH            Awake, alert, oriented x 4. Affect appropriate. Labs and Imaging   CT ABDOMEN PELVIS WO CONTRAST Additional Contrast? Oral    Result Date: 11/5/2022  EXAMINATION: CT OF THE ABDOMEN AND PELVIS WITHOUT CONTRAST 11/5/2022 10:14 am TECHNIQUE: CT of the abdomen and pelvis was performed without the administration of intravenous contrast. Multiplanar reformatted images are provided for review. Automated exposure control, iterative reconstruction, and/or weight based adjustment of the mA/kV was utilized to reduce the radiation dose to as low as reasonably achievable.  COMPARISON: 07/11/2020 HISTORY: ORDERING SYSTEM PROVIDED HISTORY: ANEMIA TECHNOLOGIST PROVIDED HISTORY: CT ABD AND PELVIS Reason for exam:->ANEMIA Additional Contrast?->Oral Reason for Exam: ANEMIA FINDINGS: Lower Chest: Visualized lungs are clear. Noncontrast imaging the base of the heart unremarkable. Lack of intravenous contrast limits evaluation of the solid abdominal viscera, the hollow abdominal viscera, and the vascular structures. Organs: With that said, no acute or suspicious hepatic abnormality identified. Mild diffuse hepatic steatosis is noted. Noncontrast imaging of the spleen is unremarkable. Adrenals are unremarkable. Pancreas unremarkable. There is new bilateral perinephric fat stranding. No hydronephrosis or hydroureter. No renal or ureteral calculi are found. GI/Bowel: Mild diverticulosis of the large bowel is seen, without CT evidence of diverticulitis. The large bowel is otherwise unremarkable in appearance. Distal esophagus, stomach, duodenal sweep, and the remainder of the small bowel are unremarkable. Pelvis: Urinary bladder and prostate unremarkable. No free pelvic fluid. Peritoneum/Retroperitoneum: Abdominal aorta is normal in caliber. No retroperitoneal lymphadenopathy. Bones/Soft Tissues: No acute or suspicious bony abnormality identified. Severe multilevel degenerative disc disease is present, greatest L3-L4 and L4-L5, progressed when compared to the previous exam. The extra-abdominal and extra pelvic soft tissues are unremarkable. No definite abnormality identified to explain anemia. Mild diverticulosis of the large bowel is present without CT evidence of diverticulitis. Development of bilateral perinephric fat stranding which raises the the possibility of urinary tract infection or inflammation. Please correlate clinically. XR CHEST PORTABLE    Result Date: 11/4/2022  EXAMINATION: ONE XRAY VIEW OF THE CHEST 11/4/2022 7:20 pm COMPARISON: None.  HISTORY: ORDERING SYSTEM PROVIDED HISTORY: Chest Pain TECHNOLOGIST PROVIDED HISTORY: Reason for exam:->Chest Pain Reason for Exam: CHEST PAIN Additional signs and symptoms: NA Relevant Medical/Surgical History: HYPERTENSION FINDINGS: Frontal portable view of the chest.  Normal lung volume. Eventration of the right hemidiaphragm. No focal airspace disease. Prominent pulmonary vasculature. No pleural effusion or pneumothorax. Cardiomegaly. Tortuous and atherosclerotic thoracic aorta. Multilevel degenerative disc disease. 1.  No focal airspace disease. 2.  Cardiomegaly and pulmonary vascular congestion. CBC:   Recent Labs     11/07/22 0428 11/08/22 0334   WBC 10.1 9.6   HGB 7.9* 7.8*    372     BMP:    Recent Labs     11/07/22 0428 11/08/22 0334   * 134*   K 4.0 4.1    101   CO2 21 21   BUN 8 6   CREATININE 0.9 0.8*   GLUCOSE 95 101*     Hepatic:   Recent Labs     11/07/22 0428 11/08/22 0334   AST 49* 46*   ALT 35 37   BILITOT 1.0 1.0   ALKPHOS 100 100     Lipids: No results found for: CHOL, HDL, TRIG  Hemoglobin A1C: No results found for: LABA1C  TSH: No results found for: TSH  Troponin:   Lab Results   Component Value Date/Time    TROPONINT <0.010 11/06/2022 05:35 AM    TROPONINT <0.010 11/04/2022 04:56 PM     Lactic Acid: No results for input(s): LACTA in the last 72 hours. BNP: No results for input(s): PROBNP in the last 72 hours.   UA:  Lab Results   Component Value Date/Time    NITRU NEGATIVE 11/05/2022 12:30 AM    COLORU YELLOW 11/05/2022 12:30 AM    WBCUA 89 11/05/2022 12:30 AM    RBCUA 139 11/05/2022 12:30 AM    MUCUS RARE 11/05/2022 12:30 AM    TRICHOMONAS NONE SEEN 11/05/2022 12:30 AM    BACTERIA OCCASIONAL 11/05/2022 12:30 AM    CLARITYU CLOUDY 11/05/2022 12:30 AM    SPECGRAV 1.015 11/05/2022 12:30 AM    LEUKOCYTESUR MODERATE NUMBER OR AMOUNT OBSERVED 11/05/2022 12:30 AM    UROBILINOGEN 4.0 11/05/2022 12:30 AM    BILIRUBINUR SMALL NUMBER OR AMOUNT OBSERVED 11/05/2022 12:30 AM    BLOODU LARGE NUMBER OR AMOUNT OBSERVED 11/05/2022 12:30 AM    KETUA NEGATIVE 11/05/2022 12:30 AM     Urine Cultures: No results found for: LABURIN  Blood Cultures: No results found for: BC  No results found for: BLOODCULT2  Organism: No results found for: ORG    Time Spent Discharging patient 45 minutes    Electronically signed by Chris Perla MD on 11/9/2022 at 1:11 PM

## 2022-11-09 NOTE — CARE COORDINATION
Pt dc's and is agreeable to co-pays of $100/wk for the drug, $12 day/supplies & $21 nsg visit. Pt willing to learn infusion. Spoke to pts nurse Emerson Hannon. [facility] and home infusion all set up. Delivery tomorrow by noon and [facility] to start with [facility] tomorrow around 3p. Pt had dose for today per [facility] Dorita Pimentel with at [facility] and requested drug be delivered IV push. Script faxed to both [facility] (Prisca confirmed received) and [employer] for processing.

## 2022-11-09 NOTE — CARE COORDINATION
Reviewed chart and discussed in IDR, plan is home today possibly on iv atb for 2 weeks. Discussed Issue of no MD to follow for [facility]., PS to Dr Kareem Kulkarni to confirm plan will be iv atb , if could he follow with [facility]. Also PS to Sen KANG If pt going to go on iv atb will needs a PICC line prior to leaving.

## 2022-11-09 NOTE — CARE COORDINATION
[id number] [facility] Liaison spoke with pt and pt is agreeable to [facility] at discharge. Address and number verified. Pt willing to learn IV and Dr Sintia Ordonez willing to follow for [facility] at [place] per Hortencia LUNDY.  Please place inpatient consult to home health needs order in Epic at [place]. Script also needed for IV infusion. [facility] Liaison aware of discharge & will initiate Shantanu Whipple. Awaiting script & line placement.

## 2022-11-09 NOTE — CONSULTS
IV Consult complete. Midline meets therapeutic needs at this time for Rocephin total 14 days. Arrow Endurance 18g 8cm midline inserted in RUE Brachial Vessel x 1 attempts using sterile ultrasound guided technique. Brisk blood return, flushes without resistance. OK to draw blood from midline.

## 2022-11-10 ENCOUNTER — HOSPITAL ENCOUNTER (OUTPATIENT)
Age: 72
Setting detail: OBSERVATION
Discharge: HOME HEALTH | End: 2022-11-11
Attending: EMERGENCY MEDICINE | Admitting: STUDENT IN AN ORGANIZED HEALTH CARE EDUCATION/TRAINING PROGRAM
Payer: MEDICARE

## 2022-11-10 ENCOUNTER — APPOINTMENT (OUTPATIENT)
Dept: GENERAL RADIOLOGY | Age: 72
End: 2022-11-10
Payer: MEDICARE

## 2022-11-10 DIAGNOSIS — R00.1 BRADYCARDIA: Primary | ICD-10-CM

## 2022-11-10 LAB
ALBUMIN SERPL-MCNC: 3.2 GM/DL (ref 3.4–5)
ALP BLD-CCNC: 101 IU/L (ref 40–129)
ALT SERPL-CCNC: 47 U/L (ref 10–40)
ANION GAP SERPL CALCULATED.3IONS-SCNC: 12 MMOL/L (ref 4–16)
AST SERPL-CCNC: 57 IU/L (ref 15–37)
BASOPHILS ABSOLUTE: 0 K/CU MM
BASOPHILS RELATIVE PERCENT: 0.4 % (ref 0–1)
BILIRUB SERPL-MCNC: 0.5 MG/DL (ref 0–1)
BUN BLDV-MCNC: 13 MG/DL (ref 6–23)
CALCIUM SERPL-MCNC: 9.4 MG/DL (ref 8.3–10.6)
CHLORIDE BLD-SCNC: 104 MMOL/L (ref 99–110)
CO2: 22 MMOL/L (ref 21–32)
CREAT SERPL-MCNC: 1 MG/DL (ref 0.9–1.3)
CULTURE: NORMAL
DIFFERENTIAL TYPE: ABNORMAL
EOSINOPHILS ABSOLUTE: 0.2 K/CU MM
EOSINOPHILS RELATIVE PERCENT: 2.5 % (ref 0–3)
GFR SERPL CREATININE-BSD FRML MDRD: >60 ML/MIN/1.73M2
GLUCOSE BLD-MCNC: 96 MG/DL (ref 70–99)
HCT VFR BLD CALC: 28.1 % (ref 42–52)
HEMOGLOBIN: 8.6 GM/DL (ref 13.5–18)
IMMATURE NEUTROPHIL %: 0.5 % (ref 0–0.43)
LYMPHOCYTES ABSOLUTE: 1.6 K/CU MM
LYMPHOCYTES RELATIVE PERCENT: 20.3 % (ref 24–44)
Lab: NORMAL
MCH RBC QN AUTO: 31 PG (ref 27–31)
MCHC RBC AUTO-ENTMCNC: 30.6 % (ref 32–36)
MCV RBC AUTO: 101.4 FL (ref 78–100)
MONOCYTES ABSOLUTE: 0.7 K/CU MM
MONOCYTES RELATIVE PERCENT: 8.2 % (ref 0–4)
NUCLEATED RBC %: 0 %
PDW BLD-RTO: 15 % (ref 11.7–14.9)
PLATELET # BLD: 458 K/CU MM (ref 140–440)
PMV BLD AUTO: 9.3 FL (ref 7.5–11.1)
POTASSIUM SERPL-SCNC: 4 MMOL/L (ref 3.5–5.1)
RBC # BLD: 2.77 M/CU MM (ref 4.6–6.2)
SEGMENTED NEUTROPHILS ABSOLUTE COUNT: 5.5 K/CU MM
SEGMENTED NEUTROPHILS RELATIVE PERCENT: 68.1 % (ref 36–66)
SODIUM BLD-SCNC: 138 MMOL/L (ref 135–145)
SPECIMEN: NORMAL
TOTAL IMMATURE NEUTOROPHIL: 0.04 K/CU MM
TOTAL NUCLEATED RBC: 0 K/CU MM
TOTAL PROTEIN: 7.5 GM/DL (ref 6.4–8.2)
TROPONIN T: <0.01 NG/ML
WBC # BLD: 8.1 K/CU MM (ref 4–10.5)

## 2022-11-10 PROCEDURE — G0378 HOSPITAL OBSERVATION PER HR: HCPCS

## 2022-11-10 PROCEDURE — 96372 THER/PROPH/DIAG INJ SC/IM: CPT

## 2022-11-10 PROCEDURE — 2580000003 HC RX 258: Performed by: STUDENT IN AN ORGANIZED HEALTH CARE EDUCATION/TRAINING PROGRAM

## 2022-11-10 PROCEDURE — 6370000000 HC RX 637 (ALT 250 FOR IP): Performed by: STUDENT IN AN ORGANIZED HEALTH CARE EDUCATION/TRAINING PROGRAM

## 2022-11-10 PROCEDURE — 93005 ELECTROCARDIOGRAM TRACING: CPT | Performed by: EMERGENCY MEDICINE

## 2022-11-10 PROCEDURE — 6360000002 HC RX W HCPCS: Performed by: STUDENT IN AN ORGANIZED HEALTH CARE EDUCATION/TRAINING PROGRAM

## 2022-11-10 PROCEDURE — 71045 X-RAY EXAM CHEST 1 VIEW: CPT

## 2022-11-10 PROCEDURE — 99285 EMERGENCY DEPT VISIT HI MDM: CPT

## 2022-11-10 PROCEDURE — 96365 THER/PROPH/DIAG IV INF INIT: CPT

## 2022-11-10 PROCEDURE — 85025 COMPLETE CBC W/AUTO DIFF WBC: CPT

## 2022-11-10 PROCEDURE — 80053 COMPREHEN METABOLIC PANEL: CPT

## 2022-11-10 PROCEDURE — 84484 ASSAY OF TROPONIN QUANT: CPT

## 2022-11-10 RX ORDER — ONDANSETRON 4 MG/1
4 TABLET, ORALLY DISINTEGRATING ORAL EVERY 8 HOURS PRN
Status: DISCONTINUED | OUTPATIENT
Start: 2022-11-10 | End: 2022-11-11 | Stop reason: HOSPADM

## 2022-11-10 RX ORDER — SODIUM CHLORIDE 0.9 % (FLUSH) 0.9 %
5-40 SYRINGE (ML) INJECTION EVERY 12 HOURS SCHEDULED
Status: DISCONTINUED | OUTPATIENT
Start: 2022-11-10 | End: 2022-11-11 | Stop reason: HOSPADM

## 2022-11-10 RX ORDER — SODIUM CHLORIDE 9 MG/ML
500 INJECTION, SOLUTION INTRAVENOUS PRN
Status: DISCONTINUED | OUTPATIENT
Start: 2022-11-10 | End: 2022-11-11 | Stop reason: HOSPADM

## 2022-11-10 RX ORDER — CEFTRIAXONE 500 MG/1
2000 INJECTION, POWDER, FOR SOLUTION INTRAMUSCULAR; INTRAVENOUS EVERY 24 HOURS
Status: DISCONTINUED | OUTPATIENT
Start: 2022-11-10 | End: 2022-11-10 | Stop reason: ALTCHOICE

## 2022-11-10 RX ORDER — ATORVASTATIN CALCIUM 40 MG/1
40 TABLET, FILM COATED ORAL NIGHTLY
Status: DISCONTINUED | OUTPATIENT
Start: 2022-11-10 | End: 2022-11-11 | Stop reason: HOSPADM

## 2022-11-10 RX ORDER — FAMOTIDINE 20 MG/1
20 TABLET, FILM COATED ORAL 2 TIMES DAILY
Status: DISCONTINUED | OUTPATIENT
Start: 2022-11-10 | End: 2022-11-11 | Stop reason: HOSPADM

## 2022-11-10 RX ORDER — SODIUM CHLORIDE 0.9 % (FLUSH) 0.9 %
5-40 SYRINGE (ML) INJECTION PRN
Status: DISCONTINUED | OUTPATIENT
Start: 2022-11-10 | End: 2022-11-11 | Stop reason: HOSPADM

## 2022-11-10 RX ORDER — ACETAMINOPHEN 325 MG/1
650 TABLET ORAL EVERY 6 HOURS PRN
Status: DISCONTINUED | OUTPATIENT
Start: 2022-11-10 | End: 2022-11-11 | Stop reason: HOSPADM

## 2022-11-10 RX ORDER — CLOPIDOGREL BISULFATE 75 MG/1
75 TABLET ORAL DAILY
Status: DISCONTINUED | OUTPATIENT
Start: 2022-11-11 | End: 2022-11-11 | Stop reason: HOSPADM

## 2022-11-10 RX ORDER — LISINOPRIL 5 MG/1
5 TABLET ORAL DAILY
Status: DISCONTINUED | OUTPATIENT
Start: 2022-11-11 | End: 2022-11-11 | Stop reason: HOSPADM

## 2022-11-10 RX ORDER — ONDANSETRON 2 MG/ML
4 INJECTION INTRAMUSCULAR; INTRAVENOUS EVERY 6 HOURS PRN
Status: DISCONTINUED | OUTPATIENT
Start: 2022-11-10 | End: 2022-11-11 | Stop reason: HOSPADM

## 2022-11-10 RX ORDER — ASPIRIN 81 MG/1
81 TABLET ORAL DAILY
Status: DISCONTINUED | OUTPATIENT
Start: 2022-11-11 | End: 2022-11-11 | Stop reason: HOSPADM

## 2022-11-10 RX ORDER — ACETAMINOPHEN 650 MG/1
650 SUPPOSITORY RECTAL EVERY 6 HOURS PRN
Status: DISCONTINUED | OUTPATIENT
Start: 2022-11-10 | End: 2022-11-11 | Stop reason: HOSPADM

## 2022-11-10 RX ORDER — ENOXAPARIN SODIUM 100 MG/ML
30 INJECTION SUBCUTANEOUS 2 TIMES DAILY
Status: DISCONTINUED | OUTPATIENT
Start: 2022-11-10 | End: 2022-11-11 | Stop reason: HOSPADM

## 2022-11-10 RX ORDER — AMLODIPINE BESYLATE 5 MG/1
5 TABLET ORAL DAILY
Status: DISCONTINUED | OUTPATIENT
Start: 2022-11-11 | End: 2022-11-11 | Stop reason: HOSPADM

## 2022-11-10 RX ORDER — POLYETHYLENE GLYCOL 3350 17 G/17G
17 POWDER, FOR SOLUTION ORAL DAILY PRN
Status: DISCONTINUED | OUTPATIENT
Start: 2022-11-10 | End: 2022-11-11 | Stop reason: HOSPADM

## 2022-11-10 RX ADMIN — ATORVASTATIN CALCIUM 40 MG: 40 TABLET, FILM COATED ORAL at 20:50

## 2022-11-10 RX ADMIN — CEFTRIAXONE SODIUM 2000 MG: 2 INJECTION, POWDER, FOR SOLUTION INTRAMUSCULAR; INTRAVENOUS at 20:51

## 2022-11-10 RX ADMIN — ENOXAPARIN SODIUM 30 MG: 100 INJECTION SUBCUTANEOUS at 20:50

## 2022-11-10 RX ADMIN — SODIUM CHLORIDE 25 ML: 9 INJECTION, SOLUTION INTRAVENOUS at 20:51

## 2022-11-10 RX ADMIN — FAMOTIDINE 20 MG: 20 TABLET ORAL at 20:50

## 2022-11-10 RX ADMIN — SODIUM CHLORIDE, PRESERVATIVE FREE 10 ML: 5 INJECTION INTRAVENOUS at 20:50

## 2022-11-10 ASSESSMENT — ENCOUNTER SYMPTOMS
COUGH: 0
EYE REDNESS: 0
TROUBLE SWALLOWING: 0
RHINORRHEA: 0
CONSTIPATION: 0
ABDOMINAL PAIN: 0
SHORTNESS OF BREATH: 0
DIARRHEA: 0
NAUSEA: 0
EYE PAIN: 0
VOMITING: 0
BACK PAIN: 0
CHEST TIGHTNESS: 0

## 2022-11-10 ASSESSMENT — PAIN - FUNCTIONAL ASSESSMENT: PAIN_FUNCTIONAL_ASSESSMENT: NONE - DENIES PAIN

## 2022-11-10 ASSESSMENT — LIFESTYLE VARIABLES: HOW OFTEN DO YOU HAVE A DRINK CONTAINING ALCOHOL: NEVER

## 2022-11-10 NOTE — ED NOTES
ED TO INPATIENT SBAR HANDOFF    Patient Name: Bertha Jain   :  1950  71 y.o. MRN:  [id number]  Preferred Name  Daxa Bashir  ED Room #:  ED31/ED-31  Family/Caregiver Present no   Restraints no   Sitter no   Sepsis Risk Score Sepsis Risk Score: 2.92    Situation  Code Status: Prior No additional code details. Allergies: Patient has no known allergies. Weight: Patient Vitals for the past 96 hrs (Last 3 readings):   Weight   11/10/22 1550 295 lb (133.8 kg)     Arrived from: home  Chief Complaint:   Chief Complaint   Patient presents with    Bradycardia     Pt states he is bradycardic     Hospital Problem/Diagnosis:  Active Problems:    * No active hospital problems. *  Resolved Problems:    * No resolved hospital problems. *    Imaging:   XR CHEST PORTABLE   Final Result   1. Bibasilar atelectasis, otherwise no acute cardiopulmonary process   identified.            Abnormal labs:   Abnormal Labs Reviewed   CBC WITH AUTO DIFFERENTIAL - Abnormal; Notable for the following components:       Result Value    RBC 2.77 (*)     Hemoglobin 8.6 (*)     Hematocrit 28.1 (*)     .4 (*)     MCHC 30.6 (*)     RDW 15.0 (*)     Platelets 802 (*)     Segs Relative 68.1 (*)     Lymphocytes % 20.3 (*)     Monocytes % 8.2 (*)     Immature Neutrophil % 0.5 (*)     All other components within normal limits   COMPREHENSIVE METABOLIC PANEL W/ REFLEX TO MG FOR LOW K - Abnormal; Notable for the following components:    Albumin 3.2 (*)     ALT 47 (*)     AST 57 (*)     All other components within normal limits     Critical values: no     Abnormal Assessment Findings:     Background  History:   Past Medical History:   Diagnosis Date    Arthritis     Hypertension     MI, old        Assessment    Vitals/MEWS: MEWS Score: 0  Level of Consciousness: Alert (0)   Vitals:    11/10/22 1545 11/10/22 1550   BP: 130/63    Pulse: 84    Resp: 14    Temp: (!) 96.7 °F (35.9 °C)    TempSrc: Oral    SpO2: 100%    Weight:  295 lb (133.8 kg)   Height: 6' 3\" (1.905 m)     FiO2 (%): NA  O2 Flow Rate: O2 Device: None (Room air)    Cardiac Rhythm:    Pain Assessment: 0   [] Verbal [] Eldon Lasso Scale  Pain Scale: Pain Assessment  Pain Assessment: None - Denies Pain  Last documented pain score (0-10 scale)    Last documented pain medication administered: NA  Mental Status: oriented, alert, thought processes intact, and able to concentrate and follow conversation  NIH Score:    C-SSRS: Risk of Suicide: No Risk  Bedside swallow:    Marcelo Coma Scale (GCS): [place] Coma Scale  Eye Opening: Spontaneous  Best Verbal Response: Oriented  Best Motor Response: Obeys commands  [place] Coma Scale Score: 15  Active LDA's:   Peripheral IV 11/10/22 Right Antecubital (Active)     PO Status: Regular  Pertinent or High Risk Medications/Drips: no   If Yes, please provide details: NA  Pending Blood Product Administration: no     You may also review the ED PT Care Timeline found under the Summary Nursing Index tab. Recommendation    Pending orders None  Plan for Discharge (if known): Additional Comments:   Patient comes from home and has been experiencing bradycardia. Patient's vitals have been stable while in the ED. Pt states he currently has a UTI and is antibiotics for that. Pt has a PICC in right upper arm which was placed yesterday and is be used for home antibiotics however it is currently not working. Will notify ED provider the need for PICC consult.     If any further questions, please call Sending RN at [phone]    Electronically signed by: Electronically signed by Ramy Nunez RN on 11/10/2022 at 6:06 PM       Ramy Nunez RN  11/10/22 2435

## 2022-11-10 NOTE — ED TRIAGE NOTES
Patient is brought to ED by EMS. Pt states his home health nurse discovered he was bradycardic after taking his metoprolol this morning. Pt is not presenting as symptomatic at this time. Pt is alert and oriented and ambulated to bed from stretcher without assistance.

## 2022-11-10 NOTE — ED PROVIDER NOTES
EMERGENCY DEPARTMENT ENCOUNTER      CHIEF COMPLAINT:   Bradycardic    HPI: Queen Bryon is a 71 y.o. male who presents to the emergency department, for evaluation of bradycardia. The patient was recently admitted to the hospital with pyelonephritis and was just discharged home on IV Rocephin through a PICC line. He states that his home health care nurse came out to see him today and his heart rate was in the 30s and so she called EMS. EMS reports heart rate in the 30s, but I was not here on their arrival and there is no rhythm strip noted on the chart. The patient takes metoprolol at home for blood pressure and did take it this morning. He states that he feels generally weak and fatigued. He denies any other complaints. REVIEW OF SYSTEMS:   Constitutional:  Denies fever or chills  Eyes:  Denies change in visual acuity  HENT:  Denies nasal congestion or sore throat  Respiratory:  Denies cough or shortness of breath  Cardiovascular: See HPI  GI:  Denies abdominal pain, nausea, vomiting, bloody stools or diarrhea  :  Denies dysuria  Musculoskeletal:  Denies back pain or joint pain  Integument:  Denies rash  Neurologic:  Denies headache, focal weakness or sensory changes  \"Remaining review of systems reviewed and negative. I have reviewed the nursing triage documentation and agree unless otherwise noted below. \"      PAST MEDICAL HISTORY:   Past Medical History:   Diagnosis Date    Arthritis     Hypertension     MI, old        CURRENT MEDICATIONS:   Home medications reviewed. SURGICAL HISTORY:   Past Surgical History:   Procedure Laterality Date    CARDIAC CATHETERIZATION      TESTICLE SURGERY         FAMILY HISTORY:   No family history on file.     SOCIAL HISTORY:   Social History     Socioeconomic History    Marital status:      Spouse name: Not on file    Number of children: Not on file    Years of education: Not on file    Highest education level: Not on file   Occupational History    Not on file Tobacco Use    Smoking status: Some Days     Types: Cigarettes    Smokeless tobacco: Never   Substance and Sexual Activity    Alcohol use: Not Currently     Comment: rarely    Drug use: Not Currently     Types: Cocaine    Sexual activity: Not on file   Other Topics Concern    Not on file   Social History Narrative    Not on file     Social Determinants of Health     Financial Resource Strain: Not on file   Food Insecurity: Not on file   Transportation Needs: Not on file   Physical Activity: Not on file   Stress: Not on file   Social Connections: Not on file   Intimate Partner Violence: Not on file   Housing Stability: Not on file       ALLERGIES: Patient has no known allergies. PHYSICAL EXAM:  VITAL SIGNS:   ED Triage Vitals   Enc Vitals Group      BP 11/10/22 1545 130/63      Heart Rate 11/10/22 1545 84      Resp 11/10/22 1545 14      Temp 11/10/22 1545 (!) 96.7 °F (35.9 °C)      Temp Source 11/10/22 1545 Oral      SpO2 11/10/22 1545 100 %      Weight 11/10/22 1550 295 lb (133.8 kg)      Height 11/10/22 1550 6' 3\" (1.905 m)      Head Circumference --       Peak Flow --       Pain Score --       Pain Loc --       Pain Edu? --       Excl. in [address]? --      Constitutional:  Non-toxic appearance  HENT: Normocephalic, Atraumatic  Eyes:  PERRL, onjunctiva normal, No discharge. Neck: Normal range of motion, No tenderness, Supple, No stridor, No lymphadenopathy. Cardiovascular:  Normal heart rate, irregular rhythm  Pulmonary/Chest:  Normal breath sounds, No respiratory distress, No wheezing  Abdomen:   Bowel sounds normal, Soft, No tenderness, No masses, No pulsatile masses  Extremities:  Normal range of motion, Intact distal pulses, No edema, No tenderness  Neurologic:  Alert & oriented x 3, Normal motor function, Sensation intact to light touch throughout, No focal deficits  Skin:  Warm, Dry, No erythema, No rash      EKG Interpretation  Interpreted by me  Compared to 11/4/2022  Rhythm: sinus bradycardia with frequent PACs  Rate: normal 83  Axis: normal  Ectopy: none  Conduction: Frequent PACs  ST Segments: no acute change  T Waves: no acute change  Clinical Impression: Sinus bradycardia with frequent PACs    Cardiac Monitor Strip Interpretation  Interpreted by me  Monitor strip interpreted for greater than 10 seconds  Rhythm: Sinus bradycardia with frequent PACs  Rate: normal  Ectopy: PACs  ST Segments: normal      Radiology / Procedures:  XR CHEST PORTABLE (Final result)  Result time 11/10/22 17:14:55  Final result by Cynthia Allen MD (11/10/22 17:14:55)                Impression:    1. Bibasilar atelectasis, otherwise no acute cardiopulmonary process   identified. Narrative:    EXAMINATION:   ONE XRAY VIEW OF THE CHEST     11/10/2022 4:11 pm     COMPARISON:   None. HISTORY:   ORDERING SYSTEM PROVIDED HISTORY: cp   TECHNOLOGIST PROVIDED HISTORY:   Reason for exam:->cp   Reason for Exam: low blood pressure   Additional signs and symptoms: none   Relevant Medical/Surgical History: htn, MI     FINDINGS:   Cardiac silhouette is stable. Bibasilar atelectasis. Lungs otherwise appear   grossly clear. No pleural effusion. No pneumothorax. Labs Reviewed   CBC WITH AUTO DIFFERENTIAL - Abnormal; Notable for the following components:       Result Value    RBC 2.77 (*)     Hemoglobin 8.6 (*)     Hematocrit 28.1 (*)     .4 (*)     MCHC 30.6 (*)     RDW 15.0 (*)     Platelets 084 (*)     Segs Relative 68.1 (*)     Lymphocytes % 20.3 (*)     Monocytes % 8.2 (*)     Immature Neutrophil % 0.5 (*)     All other components within normal limits   COMPREHENSIVE METABOLIC PANEL W/ REFLEX TO MG FOR LOW K - Abnormal; Notable for the following components:    Albumin 3.2 (*)     ALT 47 (*)     AST 57 (*)     All other components within normal limits   TROPONIN       ED COURSE & MEDICAL DECISION MAKING:  Pertinent Labs & Imaging studies reviewed.  (See chart for details)  On exam, the patient is afebrile and nontoxic appearing. He is hemodynamically stable and neurologically intact. EKG shows sinus bradycardia with frequent PACs with no ST elevation or depression. Labs are obtained and are significant for chronic anemia and mildly elevated liver enzymes with no other clinically significant lab abnormalities. .  Chest x-ray shows bibasilar atelectasis, otherwise no acute cardiopulmonary processes identified. Incidentally, the patient has a PICC line in place that would not draw. PICC consult was placed to evaluate the line. I suspect that the patient had bradycardia as low as 30 bpm.  Heart rate has improved, however he remains bradycardic with frequent PACs. I have a low suspicion for complete heart block, hemodynamic instability, STEMI, pneumonia or electrolyte derangement. . I recommended admission to the hospital and the patient was agreeable. I discussed the case with the cardiologist on-call, Dr. Kamala Jacob, who will see the patient in consult. I discussed the case with hospitalist who will admit the patient for further treatment and care. The patient is currently in stable condition awaiting admission. Clinical Impression:  1. Bradycardia        Comment: Please note this report has been produced using speech recognition software and may contain errors related to that system including errors in grammar, punctuation, and spelling, as well as words and phrases that may be inappropriate. If there are any questions or concerns please feel free to contact the dictating provider for clarification.         Edith Torres MD  11/13/22 5549

## 2022-11-10 NOTE — H&P
V2.0  History and Physical      Name:  Queen Bryon /Age/Sex: 1950  (71 y.o. male)   MRN & CSN:  [id number] & [id number] Encounter Date/Time: 11/10/2022 6:44 PM EST   Location:  [facility] PCP: Rashawn Gaming MD       Hospital Day: 1    Assessment and Plan:   Queen Bryon is a 71 y.o. male with a pmh of  NSTEMI, CAD s/p PCI, HTN, tobacco abuse who presents with Bradycardia    Hospital Problems             Last Modified POA    * (Principal) Bradycardia 11/10/2022 Yes       Bradycardia  -Asymptomatic, was noticed by home health aide, HR within normal limits since patient has been in ED, EKG showed HR 83/min, with some premature atrial complexes  -Hold metoprolol and cardiology consulted from ED, Admitted as observation    2. Pyelonephritis  -On rocephin 2 g IV for total 14 days, PICC line not functioning well, consult picc line team    3. CAD s/p PCI of the left circumflex coronary artery in  -  -stable. Beta blockers on hold can resume after cardiology evaluates patient. 4. HTN: Continue home meds    5. Anemia: Hgb 8.6 stable, plan for outpatient EGD as per GI      Disposition:   Current Living situation: Home w home health  Expected Disposition: home w home health  Estimated D/C: 1 day    Diet No diet orders on file   DVT Prophylaxis [x] Lovenox, []  Heparin, [] SCDs, [] Ambulation,  [] Eliquis, [] Xarelto   Code Status Prior   Surrogate Decision Maker/ POA      History from:     patient    History of Present Illness:     Chief Complaint: Bradycardia  Queen Bryon is a 71 y.o. male with pmh of   CAD s/p PCI, HTN, tobacco abuse, anemia who presents with Bradycardia which was noticed by home health nurse during her visit at patient's home this morning. As per the patient HR was in 30s, he was asymptomatic, Denies any active complaints, He did take his metoprolol this morning. He was feeling better since he reached home.      Review of Systems: Need 10 Elements   Review of Systems Constitutional:  Positive for fatigue. Negative for activity change, appetite change and fever. HENT:  Negative for congestion, rhinorrhea and trouble swallowing. Eyes:  Negative for pain and redness. Respiratory:  Negative for cough, chest tightness and shortness of breath. Cardiovascular:  Negative for chest pain, palpitations and leg swelling. Gastrointestinal:  Negative for abdominal pain, constipation, diarrhea, nausea and vomiting. Genitourinary:  Negative for difficulty urinating and dysuria. Musculoskeletal:  Negative for back pain and myalgias. Skin:  Negative for rash. Neurological:  Negative for dizziness, syncope, light-headedness, numbness and headaches. Psychiatric/Behavioral:  Negative for confusion. Objective:   No intake or output data in the 24 hours ending 11/10/22 1844   Vitals:   Vitals:    11/10/22 1630 11/10/22 1700 11/10/22 1730 11/10/22 1800   BP: 118/81 131/68 123/83 111/62   Pulse: 88 99 86 96   Resp: 21 24 16 18   Temp:    98 °F (36.7 °C)   TempSrc:       SpO2:       Weight:       Height:           Medications Prior to Admission     Prior to Admission medications    Medication Sig Start Date End Date Taking?  Authorizing Provider   aspirin 81 MG chewable tablet Take 1 tablet by mouth daily 11/10/22   Diane Downs MD   atorvastatin (LIPITOR) 40 MG tablet Take 1 tablet by mouth nightly 11/9/22   Diane Downs MD   lisinopril (PRINIVIL;ZESTRIL) 5 MG tablet Take 1 tablet by mouth daily 11/10/22   Diane Downs MD   metoprolol tartrate (LOPRESSOR) 25 MG tablet Take 0.5 tablets by mouth 2 times daily 11/9/22   Diane Downs MD   amLODIPine (NORVASC) 5 MG tablet Take 1 tablet by mouth daily 11/10/22   Diane Downs MD   clopidogrel (PLAVIX) 75 MG tablet Take 1 tablet by mouth daily 11/10/22   Diane Downs MD   famotidine (PEPCID) 20 MG tablet Take 1 tablet by mouth 2 times daily 11/9/22   Diane Downs MD   cefTRIAXone (ROCEPHIN) 500 MG injection Infuse 500 mg intravenously every 24 hours for 2 days 11/9/22 11/11/22  Jett Shane MD   cefTRIAXone (ROCEPHIN) 500 MG injection Infuse 2,000 mg intravenously every 24 hours for 2 days 11/9/22 11/11/22  Jett Shane MD   ibuprofen (IBU) 400 MG tablet Take 1 tablet by mouth every 6 hours as needed for Pain 7/11/20   IVAN De Jesus       Physical Exam: Need 8 Elements   Physical Exam  Constitutional:       General: He is not in acute distress. Appearance: He is normal weight. He is not diaphoretic. HENT:      Head: Normocephalic and atraumatic. Right Ear: Tympanic membrane normal.      Left Ear: Tympanic membrane normal.      Nose: Nose normal.      Mouth/Throat:      Mouth: Mucous membranes are moist.      Pharynx: Oropharynx is clear. Eyes:      Pupils: Pupils are equal, round, and reactive to light. Cardiovascular:      Rate and Rhythm: Normal rate and regular rhythm. Pulses: Normal pulses. Heart sounds: Normal heart sounds. No murmur heard. No friction rub. No gallop. Pulmonary:      Effort: Pulmonary effort is normal. No respiratory distress. Breath sounds: Normal breath sounds. No wheezing or rales. Abdominal:      General: Abdomen is flat. Bowel sounds are normal. There is no distension. Palpations: Abdomen is soft. Tenderness: There is no abdominal tenderness. There is no guarding. Musculoskeletal:         General: No swelling, tenderness or deformity. Normal range of motion. Cervical back: Normal range of motion and neck supple. Right lower leg: No edema. Left lower leg: No edema. Skin:     General: Skin is warm. Coloration: Skin is not jaundiced or pale. Findings: No bruising, erythema, lesion or rash. Neurological:      General: No focal deficit present. Mental Status: He is alert and oriented to person, place, and time. Mental status is at baseline. Cranial Nerves: No cranial nerve deficit.       Sensory: No sensory deficit. Motor: No weakness. Psychiatric:         Mood and Affect: Mood normal.          Past History:   PMHx   Past Medical History:   Diagnosis Date    Arthritis     Hypertension     MI, old         PSHX:  has a past surgical history that includes Cardiac catheterization and Testicle surgery. Fam HX: family history is not on file. Soc HX:   Social History     Socioeconomic History    Marital status:    Tobacco Use    Smoking status: Some Days     Types: Cigarettes    Smokeless tobacco: Never   Substance and Sexual Activity    Alcohol use: Not Currently     Comment: rarely    Drug use: Not Currently     Types: Cocaine       Allergies: Allergies: No Known Allergies    Medications:   Medications:    Infusions:   PRN Meds:     Labs      CBC:   Recent Labs     11/08/22 0334 11/10/22  1626   WBC 9.6 8.1   HGB 7.8* 8.6*    458*     BMP:    Recent Labs     11/08/22  0334 11/10/22  1626   * 138   K 4.1 4.0    104   CO2 21 22   BUN 6 13   CREATININE 0.8* 1.0   GLUCOSE 101* 96     Hepatic:   Recent Labs     11/08/22  0334 11/10/22  1626   AST 46* 57*   ALT 37 47*   BILITOT 1.0 0.5   ALKPHOS 100 101     Lipids: No results found for: CHOL, HDL, TRIG  Hemoglobin A1C: No results found for: LABA1C  TSH: No results found for: TSH  Troponin:   Lab Results   Component Value Date/Time    TROPONINT <0.010 11/10/2022 04:26 PM    TROPONINT <0.010 11/06/2022 05:35 AM    TROPONINT <0.010 11/04/2022 04:56 PM     Lactic Acid: No results for input(s): LACTA in the last 72 hours. BNP: No results for input(s): PROBNP in the last 72 hours.   UA:  Lab Results   Component Value Date/Time    NITRU NEGATIVE 11/05/2022 12:30 AM    COLORU YELLOW 11/05/2022 12:30 AM    WBCUA 89 11/05/2022 12:30 AM    RBCUA 139 11/05/2022 12:30 AM    MUCUS RARE 11/05/2022 12:30 AM    TRICHOMONAS NONE SEEN 11/05/2022 12:30 AM    BACTERIA OCCASIONAL 11/05/2022 12:30 AM    CLARITYU CLOUDY 11/05/2022 12:30 AM    SPECGRAV 1.015 11/05/2022 12:30 AM    LEUKOCYTESUR MODERATE NUMBER OR AMOUNT OBSERVED 11/05/2022 12:30 AM    UROBILINOGEN 4.0 11/05/2022 12:30 AM    BILIRUBINUR SMALL NUMBER OR AMOUNT OBSERVED 11/05/2022 12:30 AM    BLOODU LARGE NUMBER OR AMOUNT OBSERVED 11/05/2022 12:30 AM    KETUA NEGATIVE 11/05/2022 12:30 AM     Urine Cultures: No results found for: Tutu Carlson  Blood Cultures: No results found for: BC  No results found for: BLOODCULT2  Organism: No results found for: ORG    Imaging/Diagnostics Last 24 Hours   CT ABDOMEN PELVIS WO CONTRAST Additional Contrast? Oral    Result Date: 11/5/2022  EXAMINATION: CT OF THE ABDOMEN AND PELVIS WITHOUT CONTRAST 11/5/2022 10:14 am TECHNIQUE: CT of the abdomen and pelvis was performed without the administration of intravenous contrast. Multiplanar reformatted images are provided for review. Automated exposure control, iterative reconstruction, and/or weight based adjustment of the mA/kV was utilized to reduce the radiation dose to as low as reasonably achievable. COMPARISON: 07/11/2020 HISTORY: ORDERING SYSTEM PROVIDED HISTORY: ANEMIA TECHNOLOGIST PROVIDED HISTORY: CT ABD AND PELVIS Reason for exam:->ANEMIA Additional Contrast?->Oral Reason for Exam: ANEMIA FINDINGS: Lower Chest: Visualized lungs are clear. Noncontrast imaging the base of the heart unremarkable. Lack of intravenous contrast limits evaluation of the solid abdominal viscera, the hollow abdominal viscera, and the vascular structures. Organs: With that said, no acute or suspicious hepatic abnormality identified. Mild diffuse hepatic steatosis is noted. Noncontrast imaging of the spleen is unremarkable. Adrenals are unremarkable. Pancreas unremarkable. There is new bilateral perinephric fat stranding. No hydronephrosis or hydroureter. No renal or ureteral calculi are found. GI/Bowel: Mild diverticulosis of the large bowel is seen, without CT evidence of diverticulitis. The large bowel is otherwise unremarkable in appearance. Distal esophagus, stomach, duodenal sweep, and the remainder of the small bowel are unremarkable. Pelvis: Urinary bladder and prostate unremarkable. No free pelvic fluid. Peritoneum/Retroperitoneum: Abdominal aorta is normal in caliber. No retroperitoneal lymphadenopathy. Bones/Soft Tissues: No acute or suspicious bony abnormality identified. Severe multilevel degenerative disc disease is present, greatest L3-L4 and L4-L5, progressed when compared to the previous exam. The extra-abdominal and extra pelvic soft tissues are unremarkable. No definite abnormality identified to explain anemia. Mild diverticulosis of the large bowel is present without CT evidence of diverticulitis. Development of bilateral perinephric fat stranding which raises the the possibility of urinary tract infection or inflammation. Please correlate clinically. XR CHEST PORTABLE    Result Date: 11/10/2022  EXAMINATION: ONE XRAY VIEW OF THE CHEST 11/10/2022 4:11 pm COMPARISON: None. HISTORY: ORDERING SYSTEM PROVIDED HISTORY: cp TECHNOLOGIST PROVIDED HISTORY: Reason for exam:->cp Reason for Exam: low blood pressure Additional signs and symptoms: none Relevant Medical/Surgical History: htn, MI FINDINGS: Cardiac silhouette is stable. Bibasilar atelectasis. Lungs otherwise appear grossly clear. No pleural effusion. No pneumothorax. 1. Bibasilar atelectasis, otherwise no acute cardiopulmonary process identified. XR CHEST PORTABLE    Result Date: 11/4/2022  EXAMINATION: ONE XRAY VIEW OF THE CHEST 11/4/2022 7:20 pm COMPARISON: None. HISTORY: ORDERING SYSTEM PROVIDED HISTORY: Chest Pain TECHNOLOGIST PROVIDED HISTORY: Reason for exam:->Chest Pain Reason for Exam: CHEST PAIN Additional signs and symptoms: NA Relevant Medical/Surgical History: HYPERTENSION FINDINGS: Frontal portable view of the chest.  Normal lung volume. Eventration of the right hemidiaphragm. No focal airspace disease. Prominent pulmonary vasculature.   No pleural effusion or pneumothorax. Cardiomegaly. Tortuous and atherosclerotic thoracic aorta. Multilevel degenerative disc disease. 1.  No focal airspace disease. 2.  Cardiomegaly and pulmonary vascular congestion.          Electronically signed by Susana Berg MD on 11/10/2022 at 6:44 PM

## 2022-11-11 VITALS
WEIGHT: 295 LBS | BODY MASS INDEX: 36.68 KG/M2 | OXYGEN SATURATION: 98 % | TEMPERATURE: 97.7 F | HEART RATE: 87 BPM | SYSTOLIC BLOOD PRESSURE: 130 MMHG | HEIGHT: 75 IN | DIASTOLIC BLOOD PRESSURE: 65 MMHG | RESPIRATION RATE: 16 BRPM

## 2022-11-11 PROBLEM — R00.1 BRADYCARDIA: Status: RESOLVED | Noted: 2022-11-10 | Resolved: 2022-11-11

## 2022-11-11 LAB
EKG ATRIAL RATE: 83 BPM
EKG DIAGNOSIS: NORMAL
EKG Q-T INTERVAL: 396 MS
EKG QRS DURATION: 86 MS
EKG QTC CALCULATION (BAZETT): 465 MS
EKG R AXIS: 37 DEGREES
EKG T AXIS: 26 DEGREES
EKG VENTRICULAR RATE: 83 BPM

## 2022-11-11 PROCEDURE — 76937 US GUIDE VASCULAR ACCESS: CPT

## 2022-11-11 PROCEDURE — 96366 THER/PROPH/DIAG IV INF ADDON: CPT

## 2022-11-11 PROCEDURE — 6360000002 HC RX W HCPCS: Performed by: STUDENT IN AN ORGANIZED HEALTH CARE EDUCATION/TRAINING PROGRAM

## 2022-11-11 PROCEDURE — 6370000000 HC RX 637 (ALT 250 FOR IP): Performed by: STUDENT IN AN ORGANIZED HEALTH CARE EDUCATION/TRAINING PROGRAM

## 2022-11-11 PROCEDURE — 93010 ELECTROCARDIOGRAM REPORT: CPT | Performed by: INTERNAL MEDICINE

## 2022-11-11 PROCEDURE — 36410 VNPNXR 3YR/> PHY/QHP DX/THER: CPT

## 2022-11-11 PROCEDURE — 2709999900 HC NON-CHARGEABLE SUPPLY

## 2022-11-11 PROCEDURE — 2580000003 HC RX 258: Performed by: STUDENT IN AN ORGANIZED HEALTH CARE EDUCATION/TRAINING PROGRAM

## 2022-11-11 PROCEDURE — 99223 1ST HOSP IP/OBS HIGH 75: CPT | Performed by: INTERNAL MEDICINE

## 2022-11-11 PROCEDURE — G0378 HOSPITAL OBSERVATION PER HR: HCPCS

## 2022-11-11 PROCEDURE — 96372 THER/PROPH/DIAG INJ SC/IM: CPT

## 2022-11-11 RX ORDER — CEFTRIAXONE 500 MG/1
2000 INJECTION, POWDER, FOR SOLUTION INTRAMUSCULAR; INTRAVENOUS EVERY 24 HOURS
Qty: 28 EACH | Refills: 0 | Status: SHIPPED | OUTPATIENT
Start: 2022-11-11 | End: 2022-11-18

## 2022-11-11 RX ADMIN — CEFTRIAXONE SODIUM 2000 MG: 2 INJECTION, POWDER, FOR SOLUTION INTRAMUSCULAR; INTRAVENOUS at 15:01

## 2022-11-11 RX ADMIN — ENOXAPARIN SODIUM 30 MG: 100 INJECTION SUBCUTANEOUS at 08:28

## 2022-11-11 RX ADMIN — CLOPIDOGREL BISULFATE 75 MG: 75 TABLET ORAL at 08:28

## 2022-11-11 RX ADMIN — FAMOTIDINE 20 MG: 20 TABLET ORAL at 08:28

## 2022-11-11 RX ADMIN — SODIUM CHLORIDE, PRESERVATIVE FREE 10 ML: 5 INJECTION INTRAVENOUS at 08:29

## 2022-11-11 RX ADMIN — LISINOPRIL 5 MG: 5 TABLET ORAL at 08:28

## 2022-11-11 RX ADMIN — AMLODIPINE BESYLATE 5 MG: 5 TABLET ORAL at 08:28

## 2022-11-11 RX ADMIN — ASPIRIN 81 MG: 81 TABLET, COATED ORAL at 08:28

## 2022-11-11 NOTE — PLAN OF CARE
Problem: Discharge Planning  Goal: Discharge to home or other facility with appropriate resources  Outcome: Completed  Flowsheets (Taken 11/11/2022 3530)  Discharge to home or other facility with appropriate resources:   Identify barriers to discharge with patient and caregiver   Arrange for needed discharge resources and transportation as appropriate   Identify discharge learning needs (meds, wound care, etc)   Arrange for interpreters to assist at discharge as needed   Refer to discharge planning if patient needs post-hospital services based on physician order or complex needs related to functional status, cognitive ability or social support system     Problem: Skin/Tissue Integrity  Goal: Absence of new skin breakdown  Description: 1. Monitor for areas of redness and/or skin breakdown  2. Assess vascular access sites hourly  3. Every 4-6 hours minimum:  Change oxygen saturation probe site  4. Every 4-6 hours:  If on nasal continuous positive airway pressure, respiratory therapy assess nares and determine need for appliance change or resting period.   Outcome: Completed     Problem: Safety - Adult  Goal: Free from fall injury  Outcome: Completed

## 2022-11-11 NOTE — PROGRESS NOTES
Outpatient Pharmacy Progress Note for Meds-to-Beds    Total number of Prescriptions Filled: 0  The following prescription(s) were refilled to soon per insurance (patient has some at home): Metoprolol, clopidogrel, and amlodipine. Spoke with case management who is working with [facility] to arrange for IV antibiotics at home. Thank you for letting us serve your patients.   [facility]    [address], [address] [facility]    Phone: [phone]    Fax: [phone]

## 2022-11-11 NOTE — CONSULTS
Consult completed. Extended Dwell Catheter occluded. Dressing changed per protocol. Pt has patent IV access and therapeutic needs met. Catheter was kinked, line removed. aMrina Delgado, primary RN notified.

## 2022-11-11 NOTE — DISCHARGE SUMMARY
V2.0  Discharge Summary    Name:  Howie Garcia /Age/Sex: 1950 (71 y.o. male)   Admit Date: 11/10/2022  Discharge Date: 22    MRN & CSN:  [id number] & [id number] Encounter Date and Time 22 11:14 AM EST    Attending:  Farhana Paniagua MD Discharging Provider: Farhana Paniagua MD, MD       Hospital Course:     Brief HPI: Howie Garcia is a 71 y.o. male who presented with NSTEMI, CAD, HTN recent pyelonephritis admission discharged home with IV antibiotics presented with drug induced bradycardia. Brief Problem Based Course:   Patient was recently discharged form hospital after management of pyelonephritis and was sent home with IV antibiotics and PICC line. Came in with bradycardia due to metoprolol. Cardiology saw the patient and recommended to continue BB after looking at the telemtry showing signs of PACs and PVCs and discharge home. Agree with plan. Discussed plan with patient who agreed with plan. Cardiology recommends outpatient stress test. Patient is aware of the plan on discharge. The patient expressed appropriate understanding of, and agreement with the discharge recommendations, medications, and plan.      Consults this admission:  IP CONSULT TO CARDIOLOGY  IP CONSULT TO IV TEAM  IP CONSULT TO CARDIOLOGY    Discharge Diagnosis:   Bradycardia        Discharge Instruction:   Follow up appointments: PCP, Cardio   Primary care physician: Yanna Bonner MD within 2 weeks  Diet: cardiac diet   Activity: activity as tolerated  Disposition: Discharged to:   [x]Home, []C, []SNF, []Acute Rehab, []Hospice   Condition on discharge: Stable  Labs and Tests to be Followed up as an outpatient by PCP or Specialist: Continue your IV Abx    Discharge Medications:        Medication List        CHANGE how you take these medications      cefTRIAXone 500 MG injection  Commonly known as: ROCEPHIN  Infuse 2,000 mg intravenously every 24 hours for 7 days  What changed: Another medication with the same name was Breath sounds: Normal breath sounds. No wheezing, rhonchi or rales. Abdominal:      General: Abdomen is flat. Bowel sounds are normal. There is no distension. Palpations: Abdomen is soft. Tenderness: There is no abdominal tenderness. Musculoskeletal:         General: No deformity. Normal range of motion. Cervical back: Normal range of motion and neck supple. Right lower leg: No edema. Left lower leg: No edema. Skin:     Coloration: Skin is not jaundiced or pale. Neurological:      General: No focal deficit present. Mental Status: He is alert and oriented to person, place, and time. Mental status is at baseline. Motor: Weakness present. Labs and Imaging   XR CHEST PORTABLE    Result Date: 11/10/2022  EXAMINATION: ONE XRAY VIEW OF THE CHEST 11/10/2022 4:11 pm COMPARISON: None. HISTORY: ORDERING SYSTEM PROVIDED HISTORY: cp TECHNOLOGIST PROVIDED HISTORY: Reason for exam:->cp Reason for Exam: low blood pressure Additional signs and symptoms: none Relevant Medical/Surgical History: htn, MI FINDINGS: Cardiac silhouette is stable. Bibasilar atelectasis. Lungs otherwise appear grossly clear. No pleural effusion. No pneumothorax. 1. Bibasilar atelectasis, otherwise no acute cardiopulmonary process identified. CBC:   Recent Labs     11/10/22  1626   WBC 8.1   HGB 8.6*   *     BMP:    Recent Labs     11/10/22  1626      K 4.0      CO2 22   BUN 13   CREATININE 1.0   GLUCOSE 96     Hepatic:   Recent Labs     11/10/22  1626   AST 57*   ALT 47*   BILITOT 0.5   ALKPHOS 101     Lipids: No results found for: CHOL, HDL, TRIG  Hemoglobin A1C: No results found for: LABA1C  TSH: No results found for: TSH  Troponin:   Lab Results   Component Value Date/Time    TROPONINT <0.010 11/10/2022 04:26 PM    TROPONINT <0.010 11/06/2022 05:35 AM    TROPONINT <0.010 11/04/2022 04:56 PM     Lactic Acid: No results for input(s): LACTA in the last 72 hours.   BNP: No results for input(s): PROBNP in the last 72 hours.   UA:  Lab Results   Component Value Date/Time    NITRU NEGATIVE 11/05/2022 12:30 AM    COLORU YELLOW 11/05/2022 12:30 AM    WBCUA 89 11/05/2022 12:30 AM    RBCUA 139 11/05/2022 12:30 AM    MUCUS RARE 11/05/2022 12:30 AM    TRICHOMONAS NONE SEEN 11/05/2022 12:30 AM    BACTERIA OCCASIONAL 11/05/2022 12:30 AM    CLARITYU CLOUDY 11/05/2022 12:30 AM    SPECGRAV 1.015 11/05/2022 12:30 AM    LEUKOCYTESUR MODERATE NUMBER OR AMOUNT OBSERVED 11/05/2022 12:30 AM    UROBILINOGEN 4.0 11/05/2022 12:30 AM    BILIRUBINUR SMALL NUMBER OR AMOUNT OBSERVED 11/05/2022 12:30 AM    BLOODU LARGE NUMBER OR AMOUNT OBSERVED 11/05/2022 12:30 AM    KETUA NEGATIVE 11/05/2022 12:30 AM     Urine Cultures: No results found for: LABURIN  Blood Cultures: No results found for: BC  No results found for: BLOODCULT2  Organism: No results found for: ORG    Time Spent Discharging patient 35 minutes    Electronically signed by Loan Pino MD, MD on 11/11/2022 at 12:33 PM

## 2022-11-11 NOTE — CARE COORDINATION
[facility] Liaison aware of discharge & will initiate Shantanu Whipple. [facility] to see pt today for infusion.

## 2022-11-11 NOTE — CONSULTS
INPATIENT CARDIOLOGY CONSULT NOTE         Reason for consultation:  Bradycardia     Referring physician:  Angel Wiseman MD     Primary care physician: Kojo Royal MD      Dear Angel Wiseman MD Thank you for the consult    Chief Complaint   Patient presents with    Bradycardia     Pt states he is bradycardic       History of present illness:Thien is a 71 y. o.year old who  presents with   Chief Complaint   Patient presents with    Bradycardia     Pt states he is bradycardic       Patient is a 80-year-old gentleman who was just discharged from the hospital 3 days ago presents to the hospital again after being noted to have bradycardia by nursing aide at home. Patient denies any dizziness chest pain shortness of breath or palpitations. Please refer to recent admission and notes for detailed history    EKG shows ? bradycardia/PACs        Past medical history:    has a past medical history of Arthritis, Hypertension, and MI, old. Past surgical history:   has a past surgical history that includes Cardiac catheterization and Testicle surgery. Social History:   reports that he has been smoking cigarettes. He has never used smokeless tobacco. He reports that he does not currently use alcohol. He reports that he does not currently use drugs after having used the following drugs: Cocaine.   Family history:   no family history of CAD, STROKE of DM    No Known Allergies    amLODIPine (NORVASC) tablet 5 mg, Daily  aspirin EC tablet 81 mg, Daily  atorvastatin (LIPITOR) tablet 40 mg, Nightly  clopidogrel (PLAVIX) tablet 75 mg, Daily  famotidine (PEPCID) tablet 20 mg, BID  lisinopril (PRINIVIL;ZESTRIL) tablet 5 mg, Daily  sodium chloride flush 0.9 % injection 5-40 mL, 2 times per day  sodium chloride flush 0.9 % injection 5-40 mL, PRN  0.9 % sodium chloride infusion, PRN  enoxaparin Sodium (LOVENOX) injection 30 mg, BID  ondansetron (ZOFRAN-ODT) disintegrating tablet 4 mg, Q8H PRN   Or  ondansetron (ZOFRAN) injection 4 mg, Q6H PRN  polyethylene glycol (GLYCOLAX) packet 17 g, Daily PRN  acetaminophen (TYLENOL) tablet 650 mg, Q6H PRN   Or  acetaminophen (TYLENOL) suppository 650 mg, Q6H PRN  cefTRIAXone (ROCEPHIN) 2,000 mg in dextrose 5 % 50 mL IVPB mini-bag, Q24H      Current Facility-Administered Medications   Medication Dose Route Frequency Provider Last Rate Last Admin    amLODIPine (NORVASC) tablet 5 mg  5 mg Oral Daily Jeffery Hein MD   5 mg at 11/11/22 3000    aspirin EC tablet 81 mg  81 mg Oral Daily Jeffery Hein MD   81 mg at 11/11/22 0828    atorvastatin (LIPITOR) tablet 40 mg  40 mg Oral Nightly Jeffery Hein MD   40 mg at 11/10/22 2050    clopidogrel (PLAVIX) tablet 75 mg  75 mg Oral Daily Jeffery Hein MD   75 mg at 11/11/22 0828    famotidine (PEPCID) tablet 20 mg  20 mg Oral BID Jeffery Hein MD   20 mg at 11/11/22 0828    lisinopril (PRINIVIL;ZESTRIL) tablet 5 mg  5 mg Oral Daily Jeffery Hein MD   5 mg at 11/11/22 7760    sodium chloride flush 0.9 % injection 5-40 mL  5-40 mL IntraVENous 2 times per day Jeffery Hein MD   10 mL at 11/11/22 0829    sodium chloride flush 0.9 % injection 5-40 mL  5-40 mL IntraVENous PRN Jeffery Hein MD        0.9 % sodium chloride infusion  500 mL IntraVENous PRN Jeffery Hein MD   Stopped at 11/10/22 2140    enoxaparin Sodium (LOVENOX) injection 30 mg  30 mg SubCUTAneous BID Jeffery Hein MD   30 mg at 11/11/22 0828    ondansetron (ZOFRAN-ODT) disintegrating tablet 4 mg  4 mg Oral Q8H PRN Jeffery Hein MD        Or    ondansetron [facility]) injection 4 mg  4 mg IntraVENous Q6H PRN Jeffery Hein MD        polyethylene glycol (GLYCOLAX) packet 17 g  17 g Oral Daily PRN Jeffery Hein MD        acetaminophen (TYLENOL) tablet 650 mg  650 mg Oral Q6H PRN Jeffery Hein MD        Or    acetaminophen (TYLENOL) suppository 650 mg  650 mg Rectal Q6H PRN Jeffery Hein MD        cefTRIAXone (ROCEPHIN) 2,000 mg in dextrose 5 % 50 mL IVPB mini-bag  2,000 mg IntraVENous Q24H Jeffery Hein MD   Stopped at 11/10/22 2121         Review of Systems: Constitutional: No Fever or Weight Loss   Eyes: No Decreased Vision  ENT: No Headaches, Hearing Loss or Vertigo  Cardiovascular:   no chest pain,  no dyspnea on exertion, no palpitations or loss of consciousness  Respiratory: No cough or wheezing    Gastrointestinal: No abdominal pain, appetite loss, blood in stools, constipation, diarrhea or heartburn  Genitourinary: No dysuria, trouble voiding, or hematuria  Musculoskeletal:  No gait disturbance, weakness or joint complaints  Integumentary: No rash or pruritis  Neurological: No TIA or stroke symptoms  Psychiatric: No anxiety or depression  Endocrine: No malaise, fatigue or temperature intolerance  Hematologic/Lymphatic: No bleeding problems, blood clots or swollen lymph nodes  Allergic/Immunologic: No nasal congestion or hives    All other systems were reviewed and were negative otherwise. Physical Examination:      Vitals:    11/11/22 0825   BP: 130/65   Pulse: 87   Resp: 16   Temp: 97.7 °F (36.5 °C)   SpO2: 98%      Wt Readings from Last 3 Encounters:   11/10/22 295 lb (133.8 kg)   11/08/22 287 lb 0.6 oz (130.2 kg)   07/11/20 (!) 310 lb (140.6 kg)     Body mass index is 36.87 kg/m². General Appearance:  No distress, conversant  Constitutional:  Well developed, Well nourished  HEENT:  Normocephalic, Atraumatic, Oropharynx moist   Nose normal. Neck Supple Carotid: no carotid bruit  Eyes:  Conjunctiva normal, No discharge. Respiratory:    Normal breath sounds, No respiratory distress, No wheezing, no use of accessory muscles, diaphragm movement is normal  No chest Tenderness  Cardiovascular: S1-S2 No murmurs auscultated. No rubs, thrills or gallops. Normal  rhythm. Pedal pulses are normal. Nopedal edema  GI:  Soft Non tender, non distended. Musculoskeletal:   No tenderness, No cyanosis, No clubbing. Integument:  Warm, Dry, No erythema, No rash. Lymphatic:  No lymphadenopathy noted. Neurologic:  Alert & oriented x 3  No focal deficits noted. Psychiatric:  Affect normal, Judgment normal, Mood normal.       Lab Review     Recent Labs     11/10/22  1626   WBC 8.1   HGB 8.6*   HCT 28.1*   *      Recent Labs     11/10/22  1626      K 4.0      CO2 22   BUN 13   CREATININE 1.0     Recent Labs     11/10/22  1626   AST 57*   ALT 47*   BILITOT 0.5   ALKPHOS 101     No results for input(s): TROPONINI in the last 72 hours. No results found for: BNP  Lab Results   Component Value Date    INR 1.27 11/06/2022    PROTIME 16.4 (H) 11/06/2022         All labs, images, EKGs were personally reviewed      Assessment: 71 y. o.year old with PMH of  has a past medical history of Arthritis, Hypertension, and MI, old. Medical Decision Making :       ?  Bradycardia    EKG shows sinus rhythm with PACs in a atrial bigeminal fashion. Overnight telemetry strips were reviewed which showed frequent PVCs and PACs with compensatory pauses,  Patient does not appear to have sinus bradycardia however due to higher burden of PACs and PVCs appears to have slow heart rate. Recommend outpatient Holter monitoring to assess for PACs PVCs burden  Resume beta-blocker                History of non-STEMI/CAD/  s/p PCI, left circumflex, 2015, Dr. Nghia Davison, [facility]    Essential hypertension  Hyperlipidemia  Elevated proBNP however patient clinically dehydrated and currently on IV fluids.   Continue     Patient denies any active chest pain  Complains of shortness of breath with exertion  Continue with aspirin 81 mg daily  Continue with Plavix 25 mg daily  Continue with metoprolol tartrate 12.5 mg twice daily  Continue lisinopril 10 mg p.o. daily  Continue with Lipitor 40 mg daily  Continue with Norvasc 5 mg p.o. daily    Outpatient stress test as previously planned     Pyelonephritis/urinary tract infection: IV antibiotics  Hypomagnesemia: Replete magnesium  Anemia/diarrhea: GI follow-up IV fluids  Tobacco abuse: Counseled against smoking  Morbid obesity: Advised low-fat diet and exercise as tolerated        Discussed case with patient's primary hospitalist Dr. Peterson Hightower, ER, nursing staff  Patient can be discharged home from cardiology standpoint    Thank you for the consult    Dr. Malcom Matt  11/11/2022 11:02 AM

## 2022-11-11 NOTE — CARE COORDINATION
CM met with pt to initiate discharge planning. Role of CM explained. Pt has insurance and is able to afford medication. Pt has PCP. Pt is from home alone. He is independent but uses a cane and a Rollator. Pt does not drive. He receives transportation through his insurance. Neighbor provides transportation for errands. Plan home, no needs. CM contact information given to pt. CM team available as needs arise.

## 2022-11-11 NOTE — CARE COORDINATION
Spoke with pts nurse and pt is waiting for line placement as the other line had issues. Spoke with Summer pharmacist and she moved pts IV ceftriaxone to 3p so pt can get while at the [facility] and [address] will start tomorrow.  [facility] will start

## 2022-11-11 NOTE — CONSULTS
Consult for midline completed. Indication for line: Long term IV/antibiotic administration, Rocephin 2,000 mg intravenously every 24 hours for 7 days per Dr. Castelan Friend. Midline insertion education provided to patient, risks and benefits discussed and reviewed with patient. Patient verbalized understanding, questions answered. Pressure Injectable Arrow Endurance 20g 6cm single lumen midline inserted into right cephalic vein x 1 attempt using sterile ultrasound and modified Seldinger technique without difficulty per protocol. Brisk blood return noted and flushes without resistance. Patient tolerated procedure well. Ultrasound photos of vein diameter provided below. Primary nurse Leah notified and aware. Please consult IV/PICC team if patient's needs change, questions, or concerns.

## 2022-11-13 LAB
CULTURE: NORMAL
CULTURE: NORMAL
Lab: NORMAL
Lab: NORMAL
SPECIMEN: NORMAL
SPECIMEN: NORMAL

## 2022-11-14 ENCOUNTER — HOSPITAL ENCOUNTER (OUTPATIENT)
Age: 72
Setting detail: SPECIMEN
Discharge: HOME | End: 2022-11-14
Payer: MEDICARE

## 2022-11-14 ENCOUNTER — CARE COORDINATION (OUTPATIENT)
Dept: CASE MANAGEMENT | Age: 72
End: 2022-11-14

## 2022-11-14 DIAGNOSIS — N12 PYELONEPHRITIS: Primary | ICD-10-CM

## 2022-11-14 LAB
ALBUMIN SERPL-MCNC: 3.4 GM/DL (ref 3.4–5)
ALP BLD-CCNC: 110 IU/L (ref 40–128)
ALT SERPL-CCNC: 28 U/L (ref 10–40)
ANION GAP SERPL CALCULATED.3IONS-SCNC: 13 MMOL/L (ref 4–16)
AST SERPL-CCNC: 26 IU/L (ref 15–37)
BASOPHILS ABSOLUTE: 0 K/CU MM
BASOPHILS RELATIVE PERCENT: 0.5 % (ref 0–1)
BILIRUB SERPL-MCNC: 0.4 MG/DL (ref 0–1)
BUN BLDV-MCNC: 8 MG/DL (ref 6–23)
C-REACTIVE PROTEIN, HIGH SENSITIVITY: 15.1 MG/L
CALCIUM SERPL-MCNC: 8.8 MG/DL (ref 8.3–10.6)
CHLORIDE BLD-SCNC: 106 MMOL/L (ref 99–110)
CO2: 21 MMOL/L (ref 21–32)
CREAT SERPL-MCNC: 0.8 MG/DL (ref 0.9–1.3)
DIFFERENTIAL TYPE: ABNORMAL
EOSINOPHILS ABSOLUTE: 0.3 K/CU MM
EOSINOPHILS RELATIVE PERCENT: 3.3 % (ref 0–3)
ERYTHROCYTE SEDIMENTATION RATE: 61 MM/HR (ref 0–20)
GFR SERPL CREATININE-BSD FRML MDRD: >60 ML/MIN/1.73M2
GLUCOSE BLD-MCNC: 98 MG/DL (ref 70–99)
HCT VFR BLD CALC: 27.8 % (ref 42–52)
HEMOGLOBIN: 8.5 GM/DL (ref 13.5–18)
IMMATURE NEUTROPHIL %: 0.4 % (ref 0–0.43)
LYMPHOCYTES ABSOLUTE: 1.3 K/CU MM
LYMPHOCYTES RELATIVE PERCENT: 14.9 % (ref 24–44)
MCH RBC QN AUTO: 31.7 PG (ref 27–31)
MCHC RBC AUTO-ENTMCNC: 30.6 % (ref 32–36)
MCV RBC AUTO: 103.7 FL (ref 78–100)
MONOCYTES ABSOLUTE: 0.7 K/CU MM
MONOCYTES RELATIVE PERCENT: 7.9 % (ref 0–4)
NUCLEATED RBC %: 0 %
PDW BLD-RTO: 16.1 % (ref 11.7–14.9)
PLATELET # BLD: 394 K/CU MM (ref 140–440)
PMV BLD AUTO: 9.1 FL (ref 7.5–11.1)
POTASSIUM SERPL-SCNC: 4.6 MMOL/L (ref 3.5–5.1)
RBC # BLD: 2.68 M/CU MM (ref 4.6–6.2)
SEGMENTED NEUTROPHILS ABSOLUTE COUNT: 6.2 K/CU MM
SEGMENTED NEUTROPHILS RELATIVE PERCENT: 73 % (ref 36–66)
SODIUM BLD-SCNC: 140 MMOL/L (ref 135–145)
TOTAL IMMATURE NEUTOROPHIL: 0.03 K/CU MM
TOTAL NUCLEATED RBC: 0 K/CU MM
TOTAL PROTEIN: 7 GM/DL (ref 6.4–8.2)
WBC # BLD: 8.5 K/CU MM (ref 4–10.5)

## 2022-11-14 PROCEDURE — 86140 C-REACTIVE PROTEIN: CPT

## 2022-11-14 PROCEDURE — 80053 COMPREHEN METABOLIC PANEL: CPT

## 2022-11-14 PROCEDURE — 1111F DSCHRG MED/CURRENT MED MERGE: CPT | Performed by: FAMILY MEDICINE

## 2022-11-14 PROCEDURE — 85025 COMPLETE CBC W/AUTO DIFF WBC: CPT

## 2022-11-14 PROCEDURE — 85652 RBC SED RATE AUTOMATED: CPT

## 2022-11-14 NOTE — CARE COORDINATION
[facility] Care Transitions Initial Follow Up Call    Call within 2 business days of discharge: Yes    LPN Care Coordinator contacted the patient by telephone to perform post hospital discharge assessment. Verified name and  with patient as identifiers. Provided introduction to self, and explanation of the LPN Care Coordinator role. Patient: Bertha Jain Patient : 1950   MRN: [id number]  Reason for Admission: Bradycardia  Discharge Date: 22 RARS: Readmission Risk Score: 15.9      Last Discharge  Street       Date Complaint Diagnosis Description Type Department Provider    11/10/22 Bradycardia Bradycardia ED to Hosp-Admission (Discharged) (ADMITTED) [facility] [address] Blanchard Schlatter, MD; LARRY Mccabe. Was this an external facility discharge? No Discharge Facility: [facility]    Challenges to be reviewed by the provider   Additional needs identified to be addressed with provider: Yes  Need 7 day Hospital F/U. Method of communication with provider: phone. Called and spoke with patient. Patient states, \"I'm doing well\". Denies dizziness, fainting, major fatigue, SOB, confusion and chest pain. NOT Smoking. C coming out daily for 7 days to give IV Rocephin for Kidney Infection. Patient has fair appetite and is drinking adequate fluids. Normal bladder and bowel elimination patterns. Offered patient assistance making PCP appointment,  patient Declined. States he will make appointment. Called PCP and they say patient has to call and make appointment. Instructed patient that PCP will need to be seen within 7 days of discharge. Expresses understanding. Reviewed medications with Patient. Taking Metoprolol 12.5 mg twice daily with no issues at this time. Confirmed Patient obtained and is taking medications as directed. There are no questions concerning medications at this time. Stressed importance of medication compliance. 1111F Medication Reconciliation completed. Routed to PCP. Patient is aware of when to contact MD with any new or worsening symptoms. Advised to contact PCP 24/7 with any health concerns for early outpatient intervention in an effort to avoid hospitalization. Report any worsening symptoms to PCP and/or Call 911 and/or GO TO  EMERGENCY ROOM if symptoms are severe. Expresses understanding. Instructed patient that this is the Final Transition Call and to call their Specialist/PCP for any problems or issues that may occur. Expresses understanding. Jhoana Mariee LPN    [phone]  [facility] Coordinator             LPN Care Coordinator reviewed discharge instructions with patient who verbalized understanding. The patient was given an opportunity to ask questions and does not have any further questions or concerns at this time. Were discharge instructions available to patient? Yes. Reviewed appropriate site of care based on symptoms and resources available to patient including: PCP  Home health  When to call 911. The patient agrees to contact the PCP office for questions related to their healthcare. Advance Care Planning:   Does patient have an Advance Directive: reviewed and current. Medication reconciliation was performed with patient, who verbalizes understanding of administration of home medications.  Medications reviewed, 1111F entered: yes    Was patient discharged with a pulse oximeter? no    Non-face-to-face services provided:  Obtained and reviewed discharge summary and/or continuity of care documents    Offered patient enrollment in the Remote Patient Monitoring (RPM) program for in-home monitoring: NA.    Care Transitions 24 Hour Call    Schedule Follow Up Appointment with PCP: Declined  Do you have a copy of your discharge instructions?: Yes  Do you have all of your prescriptions and are they filled?: Yes  Have you been contacted by a [facility] Pharmacist?: No  Have you scheduled your follow up appointment?: No  Do you feel like you have everything you need to keep you well at home?: Yes  Care Transitions Interventions  No Identified Needs         Follow Up  No future appointments. LPN Care Coordinator provided contact information. No further follow-up call indicated based on severity of symptoms and risk factors.   Plan for next call:     Anna Boyce LPN

## 2022-11-18 ENCOUNTER — TELEPHONE (OUTPATIENT)
Dept: INFECTIOUS DISEASES | Age: 72
End: 2022-11-18

## 2022-11-18 NOTE — TELEPHONE ENCOUNTER
Court, from [facility] called and states pt is done with antibiotics on 11-19-22. Can PICC line be removed. Please advise.

## 2022-11-21 ENCOUNTER — HOSPITAL ENCOUNTER (OUTPATIENT)
Age: 72
Setting detail: SPECIMEN
Discharge: HOME | End: 2022-11-21
Payer: MEDICARE

## 2022-11-21 LAB
ALBUMIN SERPL-MCNC: 3.6 GM/DL (ref 3.4–5)
ALP BLD-CCNC: 116 IU/L (ref 40–128)
ALT SERPL-CCNC: 15 U/L (ref 10–40)
ANION GAP SERPL CALCULATED.3IONS-SCNC: 11 MMOL/L (ref 4–16)
AST SERPL-CCNC: 23 IU/L (ref 15–37)
BASOPHILS ABSOLUTE: 0.1 K/CU MM
BASOPHILS RELATIVE PERCENT: 0.9 % (ref 0–1)
BILIRUB SERPL-MCNC: 0.5 MG/DL (ref 0–1)
BUN BLDV-MCNC: 9 MG/DL (ref 6–23)
C-REACTIVE PROTEIN, HIGH SENSITIVITY: 6.8 MG/L
CALCIUM SERPL-MCNC: 9.3 MG/DL (ref 8.3–10.6)
CHLORIDE BLD-SCNC: 107 MMOL/L (ref 99–110)
CO2: 23 MMOL/L (ref 21–32)
CREAT SERPL-MCNC: 0.7 MG/DL (ref 0.9–1.3)
DIFFERENTIAL TYPE: ABNORMAL
EOSINOPHILS ABSOLUTE: 0.3 K/CU MM
EOSINOPHILS RELATIVE PERCENT: 4.8 % (ref 0–3)
ERYTHROCYTE SEDIMENTATION RATE: 44 MM/HR (ref 0–20)
GFR SERPL CREATININE-BSD FRML MDRD: >60 ML/MIN/1.73M2
GLUCOSE BLD-MCNC: 83 MG/DL (ref 70–99)
HCT VFR BLD CALC: 30.8 % (ref 42–52)
HEMOGLOBIN: 9.7 GM/DL (ref 13.5–18)
IMMATURE NEUTROPHIL %: 0.2 % (ref 0–0.43)
LYMPHOCYTES ABSOLUTE: 1.7 K/CU MM
LYMPHOCYTES RELATIVE PERCENT: 25.4 % (ref 24–44)
MCH RBC QN AUTO: 32.2 PG (ref 27–31)
MCHC RBC AUTO-ENTMCNC: 31.5 % (ref 32–36)
MCV RBC AUTO: 102.3 FL (ref 78–100)
MONOCYTES ABSOLUTE: 0.5 K/CU MM
MONOCYTES RELATIVE PERCENT: 7.2 % (ref 0–4)
NUCLEATED RBC %: 0 %
PDW BLD-RTO: 16.2 % (ref 11.7–14.9)
PLATELET # BLD: 323 K/CU MM (ref 140–440)
PMV BLD AUTO: 9.4 FL (ref 7.5–11.1)
POTASSIUM SERPL-SCNC: 4.3 MMOL/L (ref 3.5–5.1)
RBC # BLD: 3.01 M/CU MM (ref 4.6–6.2)
SEGMENTED NEUTROPHILS ABSOLUTE COUNT: 4 K/CU MM
SEGMENTED NEUTROPHILS RELATIVE PERCENT: 61.5 % (ref 36–66)
SODIUM BLD-SCNC: 141 MMOL/L (ref 135–145)
TOTAL IMMATURE NEUTOROPHIL: 0.01 K/CU MM
TOTAL NUCLEATED RBC: 0 K/CU MM
TOTAL PROTEIN: 7.1 GM/DL (ref 6.4–8.2)
WBC # BLD: 6.5 K/CU MM (ref 4–10.5)

## 2022-11-21 PROCEDURE — 80053 COMPREHEN METABOLIC PANEL: CPT

## 2022-11-21 PROCEDURE — 85652 RBC SED RATE AUTOMATED: CPT

## 2022-11-21 PROCEDURE — 86140 C-REACTIVE PROTEIN: CPT

## 2022-11-21 PROCEDURE — 85025 COMPLETE CBC W/AUTO DIFF WBC: CPT

## 2023-05-14 ENCOUNTER — HOSPITAL ENCOUNTER (EMERGENCY)
Age: 73
Discharge: HOME | End: 2023-05-14
Attending: EMERGENCY MEDICINE
Payer: MEDICARE

## 2023-05-14 ENCOUNTER — APPOINTMENT (OUTPATIENT)
Dept: ULTRASOUND IMAGING | Age: 73
End: 2023-05-14
Payer: MEDICARE

## 2023-05-14 ENCOUNTER — APPOINTMENT (OUTPATIENT)
Dept: GENERAL RADIOLOGY | Age: 73
End: 2023-05-14
Payer: MEDICARE

## 2023-05-14 VITALS
DIASTOLIC BLOOD PRESSURE: 90 MMHG | TEMPERATURE: 97.4 F | HEART RATE: 99 BPM | WEIGHT: 270 LBS | SYSTOLIC BLOOD PRESSURE: 146 MMHG | BODY MASS INDEX: 33.75 KG/M2 | OXYGEN SATURATION: 100 % | RESPIRATION RATE: 20 BRPM

## 2023-05-14 DIAGNOSIS — M25.561 RIGHT KNEE PAIN, UNSPECIFIED CHRONICITY: Primary | ICD-10-CM

## 2023-05-14 PROCEDURE — 96372 THER/PROPH/DIAG INJ SC/IM: CPT

## 2023-05-14 PROCEDURE — 6370000000 HC RX 637 (ALT 250 FOR IP): Performed by: EMERGENCY MEDICINE

## 2023-05-14 PROCEDURE — 73562 X-RAY EXAM OF KNEE 3: CPT

## 2023-05-14 PROCEDURE — 99284 EMERGENCY DEPT VISIT MOD MDM: CPT

## 2023-05-14 PROCEDURE — 6360000002 HC RX W HCPCS: Performed by: EMERGENCY MEDICINE

## 2023-05-14 PROCEDURE — 93971 EXTREMITY STUDY: CPT

## 2023-05-14 RX ORDER — OXYCODONE HYDROCHLORIDE AND ACETAMINOPHEN 5; 325 MG/1; MG/1
1 TABLET ORAL ONCE
Status: COMPLETED | OUTPATIENT
Start: 2023-05-14 | End: 2023-05-14

## 2023-05-14 RX ADMIN — HYDROMORPHONE HYDROCHLORIDE 1 MG: 1 INJECTION, SOLUTION INTRAMUSCULAR; INTRAVENOUS; SUBCUTANEOUS at 02:22

## 2023-05-14 RX ADMIN — OXYCODONE HYDROCHLORIDE AND ACETAMINOPHEN 1 TABLET: 5; 325 TABLET ORAL at 04:25

## 2023-05-14 ASSESSMENT — PAIN DESCRIPTION - ORIENTATION: ORIENTATION: RIGHT

## 2023-05-14 ASSESSMENT — PAIN DESCRIPTION - PAIN TYPE: TYPE: ACUTE PAIN;CHRONIC PAIN;SURGICAL PAIN

## 2023-05-14 ASSESSMENT — PAIN - FUNCTIONAL ASSESSMENT
PAIN_FUNCTIONAL_ASSESSMENT: 0-10
PAIN_FUNCTIONAL_ASSESSMENT: PREVENTS OR INTERFERES WITH ALL ACTIVE AND SOME PASSIVE ACTIVITIES

## 2023-05-14 ASSESSMENT — PAIN DESCRIPTION - LOCATION: LOCATION: KNEE

## 2023-05-14 ASSESSMENT — PAIN SCALES - GENERAL: PAINLEVEL_OUTOF10: 10

## 2023-05-14 ASSESSMENT — PAIN DESCRIPTION - DESCRIPTORS: DESCRIPTORS: SHOOTING;SHARP

## 2025-02-26 ENCOUNTER — OFFICE VISIT (OUTPATIENT)
Dept: FAMILY MEDICINE CLINIC | Age: 75
End: 2025-02-26
Payer: COMMERCIAL

## 2025-02-26 VITALS
WEIGHT: 291.4 LBS | OXYGEN SATURATION: 99 % | SYSTOLIC BLOOD PRESSURE: 138 MMHG | DIASTOLIC BLOOD PRESSURE: 88 MMHG | HEART RATE: 69 BPM | BODY MASS INDEX: 36.23 KG/M2 | HEIGHT: 75 IN

## 2025-02-26 DIAGNOSIS — Z76.89 ENCOUNTER TO ESTABLISH CARE: Primary | ICD-10-CM

## 2025-02-26 DIAGNOSIS — I10 ESSENTIAL (PRIMARY) HYPERTENSION: ICD-10-CM

## 2025-02-26 DIAGNOSIS — Z11.59 NEED FOR HEPATITIS C SCREENING TEST: ICD-10-CM

## 2025-02-26 DIAGNOSIS — E78.2 MIXED HYPERLIPIDEMIA: ICD-10-CM

## 2025-02-26 DIAGNOSIS — N52.9 IMPOTENCE OF ORGANIC ORIGIN: ICD-10-CM

## 2025-02-26 DIAGNOSIS — F17.200 TOBACCO USE DISORDER: ICD-10-CM

## 2025-02-26 DIAGNOSIS — D12.6 BENIGN NEOPLASM OF COLON, UNSPECIFIED PART OF COLON: ICD-10-CM

## 2025-02-26 PROBLEM — I25.10 ATHSCL HEART DISEASE OF NATIVE CORONARY ARTERY W/O ANG PCTRS: Status: ACTIVE | Noted: 2022-11-10

## 2025-02-26 PROBLEM — N10 ACUTE PYELONEPHRITIS: Status: ACTIVE | Noted: 2022-11-04

## 2025-02-26 PROCEDURE — 3079F DIAST BP 80-89 MM HG: CPT

## 2025-02-26 PROCEDURE — 1159F MED LIST DOCD IN RCRD: CPT

## 2025-02-26 PROCEDURE — 99204 OFFICE O/P NEW MOD 45 MIN: CPT

## 2025-02-26 PROCEDURE — 3075F SYST BP GE 130 - 139MM HG: CPT

## 2025-02-26 PROCEDURE — 1123F ACP DISCUSS/DSCN MKR DOCD: CPT

## 2025-02-26 PROCEDURE — 1160F RVW MEDS BY RX/DR IN RCRD: CPT

## 2025-02-26 RX ORDER — ASPIRIN 81 MG/1
81 TABLET, CHEWABLE ORAL DAILY
Qty: 90 TABLET | Refills: 1 | Status: SHIPPED | OUTPATIENT
Start: 2025-02-26

## 2025-02-26 RX ORDER — LISINOPRIL 5 MG/1
10 TABLET ORAL DAILY
Status: CANCELLED | OUTPATIENT
Start: 2025-02-26

## 2025-02-26 RX ORDER — METOPROLOL TARTRATE 25 MG/1
12.5 TABLET, FILM COATED ORAL 2 TIMES DAILY
Qty: 90 TABLET | Refills: 1 | Status: SHIPPED | OUTPATIENT
Start: 2025-02-26 | End: 2025-08-25

## 2025-02-26 RX ORDER — CLOPIDOGREL BISULFATE 75 MG/1
75 TABLET ORAL DAILY
Qty: 90 TABLET | Refills: 1 | Status: SHIPPED | OUTPATIENT
Start: 2025-02-26

## 2025-02-26 RX ORDER — SILDENAFIL 100 MG/1
100 TABLET, FILM COATED ORAL PRN
COMMUNITY
End: 2025-02-26 | Stop reason: SDUPTHER

## 2025-02-26 RX ORDER — FUROSEMIDE 20 MG/1
TABLET ORAL
COMMUNITY
End: 2025-02-26 | Stop reason: SDUPTHER

## 2025-02-26 RX ORDER — AMLODIPINE BESYLATE 5 MG/1
10 TABLET ORAL DAILY
Status: CANCELLED | OUTPATIENT
Start: 2025-02-26

## 2025-02-26 RX ORDER — FUROSEMIDE 20 MG/1
20 TABLET ORAL DAILY
Qty: 90 TABLET | Refills: 1 | Status: SHIPPED | OUTPATIENT
Start: 2025-02-26

## 2025-02-26 RX ORDER — ATORVASTATIN CALCIUM 40 MG/1
80 TABLET, FILM COATED ORAL NIGHTLY
Qty: 90 TABLET | Refills: 1 | Status: SHIPPED | OUTPATIENT
Start: 2025-02-26

## 2025-02-26 RX ORDER — SILDENAFIL 100 MG/1
100 TABLET, FILM COATED ORAL PRN
Qty: 20 TABLET | Refills: 1 | Status: SHIPPED | OUTPATIENT
Start: 2025-02-26 | End: 2025-03-28

## 2025-02-26 RX ORDER — LISINOPRIL 5 MG/1
10 TABLET ORAL DAILY
Qty: 90 TABLET | Refills: 1 | Status: SHIPPED | OUTPATIENT
Start: 2025-02-26

## 2025-02-26 RX ORDER — ATORVASTATIN CALCIUM 40 MG/1
80 TABLET, FILM COATED ORAL NIGHTLY
Status: CANCELLED | OUTPATIENT
Start: 2025-02-26

## 2025-02-26 RX ORDER — AMLODIPINE BESYLATE 5 MG/1
5 TABLET ORAL DAILY
Qty: 90 TABLET | Refills: 1 | Status: SHIPPED | OUTPATIENT
Start: 2025-02-26

## 2025-02-26 SDOH — ECONOMIC STABILITY: FOOD INSECURITY: WITHIN THE PAST 12 MONTHS, THE FOOD YOU BOUGHT JUST DIDN'T LAST AND YOU DIDN'T HAVE MONEY TO GET MORE.: NEVER TRUE

## 2025-02-26 SDOH — ECONOMIC STABILITY: FOOD INSECURITY: WITHIN THE PAST 12 MONTHS, YOU WORRIED THAT YOUR FOOD WOULD RUN OUT BEFORE YOU GOT MONEY TO BUY MORE.: NEVER TRUE

## 2025-02-26 ASSESSMENT — PATIENT HEALTH QUESTIONNAIRE - PHQ9
2. FEELING DOWN, DEPRESSED OR HOPELESS: SEVERAL DAYS
5. POOR APPETITE OR OVEREATING: NOT AT ALL
6. FEELING BAD ABOUT YOURSELF - OR THAT YOU ARE A FAILURE OR HAVE LET YOURSELF OR YOUR FAMILY DOWN: NOT AT ALL
SUM OF ALL RESPONSES TO PHQ QUESTIONS 1-9: 1
SUM OF ALL RESPONSES TO PHQ QUESTIONS 1-9: 1
8. MOVING OR SPEAKING SO SLOWLY THAT OTHER PEOPLE COULD HAVE NOTICED. OR THE OPPOSITE, BEING SO FIGETY OR RESTLESS THAT YOU HAVE BEEN MOVING AROUND A LOT MORE THAN USUAL: NOT AT ALL
4. FEELING TIRED OR HAVING LITTLE ENERGY: NOT AT ALL
1. LITTLE INTEREST OR PLEASURE IN DOING THINGS: NOT AT ALL
10. IF YOU CHECKED OFF ANY PROBLEMS, HOW DIFFICULT HAVE THESE PROBLEMS MADE IT FOR YOU TO DO YOUR WORK, TAKE CARE OF THINGS AT HOME, OR GET ALONG WITH OTHER PEOPLE: NOT DIFFICULT AT ALL
SUM OF ALL RESPONSES TO PHQ QUESTIONS 1-9: 1
3. TROUBLE FALLING OR STAYING ASLEEP: NOT AT ALL
9. THOUGHTS THAT YOU WOULD BE BETTER OFF DEAD, OR OF HURTING YOURSELF: NOT AT ALL
7. TROUBLE CONCENTRATING ON THINGS, SUCH AS READING THE NEWSPAPER OR WATCHING TELEVISION: NOT AT ALL
SUM OF ALL RESPONSES TO PHQ QUESTIONS 1-9: 1
SUM OF ALL RESPONSES TO PHQ9 QUESTIONS 1 & 2: 1

## 2025-02-26 ASSESSMENT — ENCOUNTER SYMPTOMS
NAUSEA: 0
DIARRHEA: 0
SHORTNESS OF BREATH: 0
WHEEZING: 0
COUGH: 0
ABDOMINAL PAIN: 0

## 2025-02-26 NOTE — PROGRESS NOTES
Thien Venegas   74 y.o.  male  [id number]      Chief Complaint   Patient presents with    New Patient     Est.Care        Subjective:  74 y.o.male is here to establish care. Past medical history of Hyperlipidemia, Hypertension, gout, erectile dysfunction, NSTEMI, CAD, colon polyps, pyelonephritis, cocaine abuse (last use 2024). Surgical history includes cardiac cath with stents x2.     Hyperlipidemia  Currently takes atorvastatin 80 mg daily.     Hypertension  BP today 138/88. Currently takes amlodipine 5 mg daily, metoprolol 12.5 mg twice daily, furosemide 20 mg daily, and lisinopril 10 mg daily.     History of Colon Polyps  He last had colonoscopy over 10 years ago. He states he has cologuard at home.     Erectile Dysfunction  He takes sildenafil as needed and is helpful.     Tobacco Use  He states he is a social smoker. Smoking less than 0.5 pack per week, not ready to quit .    Health Maintenance  He reports feeling well over all today. He reports accidentally throwing his dentures away last summer and needs to go to dentist for new ones.   He states he has not received shingles vaccine, or pneumonia vaccine. He states he will go to local pharmacy for vaccines.         Review of Systems   Constitutional:  Negative for activity change, appetite change, fatigue, fever and unexpected weight change.   Respiratory:  Negative for cough, shortness of breath and wheezing.    Cardiovascular:  Negative for chest pain, palpitations and leg swelling.   Gastrointestinal:  Negative for abdominal pain, diarrhea and nausea.   Genitourinary:  Negative for dysuria.   Musculoskeletal:  Negative for arthralgias and myalgias.   Neurological:  Negative for dizziness, weakness and headaches.   Psychiatric/Behavioral:  Negative for dysphoric mood and sleep disturbance. The patient is not nervous/anxious.    All other systems reviewed and are negative.      Current Outpatient Medications   Medication Sig Dispense Refill    aspirin 81 MG

## 2025-04-30 RX ORDER — LISINOPRIL 5 MG/1
10 TABLET ORAL DAILY
Qty: 30 TABLET | Refills: 0 | Status: SHIPPED | OUTPATIENT
Start: 2025-04-30

## 2025-07-15 RX ORDER — CLOPIDOGREL BISULFATE 75 MG/1
75 TABLET ORAL DAILY
Qty: 90 TABLET | Refills: 1 | Status: SHIPPED | OUTPATIENT
Start: 2025-07-15

## 2025-07-15 RX ORDER — SILDENAFIL 100 MG/1
100 TABLET, FILM COATED ORAL PRN
Qty: 20 TABLET | Refills: 1 | Status: SHIPPED | OUTPATIENT
Start: 2025-07-15 | End: 2025-08-14

## 2025-07-15 RX ORDER — AMLODIPINE BESYLATE 5 MG/1
5 TABLET ORAL DAILY
Qty: 90 TABLET | Refills: 1 | Status: SHIPPED | OUTPATIENT
Start: 2025-07-15